# Patient Record
Sex: FEMALE | Race: WHITE | Employment: FULL TIME | URBAN - METROPOLITAN AREA
[De-identification: names, ages, dates, MRNs, and addresses within clinical notes are randomized per-mention and may not be internally consistent; named-entity substitution may affect disease eponyms.]

---

## 2021-02-22 LAB
ALBUMIN SERPL-MCNC: NORMAL G/DL
ALP BLD-CCNC: NORMAL U/L
ALT SERPL-CCNC: NORMAL U/L
ANION GAP SERPL CALCULATED.3IONS-SCNC: NORMAL MMOL/L
AST SERPL-CCNC: NORMAL U/L
BILIRUB SERPL-MCNC: NORMAL MG/DL
BUN BLDV-MCNC: NORMAL MG/DL
CALCIUM SERPL-MCNC: NORMAL MG/DL
CHLORIDE BLD-SCNC: NORMAL MMOL/L
CHOLESTEROL, TOTAL: NORMAL
CHOLESTEROL/HDL RATIO: NORMAL
CO2: NORMAL
CREAT SERPL-MCNC: NORMAL MG/DL
GFR CALCULATED: NORMAL
GLUCOSE BLD-MCNC: NORMAL MG/DL
HDLC SERPL-MCNC: NORMAL MG/DL
LDL CHOLESTEROL CALCULATED: NORMAL
NONHDLC SERPL-MCNC: NORMAL MG/DL
POTASSIUM SERPL-SCNC: NORMAL MMOL/L
SODIUM BLD-SCNC: NORMAL MMOL/L
TOTAL PROTEIN: NORMAL
TRIGL SERPL-MCNC: NORMAL MG/DL
VLDLC SERPL CALC-MCNC: NORMAL MG/DL

## 2021-04-28 VITALS
OXYGEN SATURATION: 98 % | HEART RATE: 98 BPM | BODY MASS INDEX: 28.12 KG/M2 | DIASTOLIC BLOOD PRESSURE: 74 MMHG | HEIGHT: 66 IN | SYSTOLIC BLOOD PRESSURE: 130 MMHG | RESPIRATION RATE: 14 BRPM | TEMPERATURE: 98 F | WEIGHT: 175 LBS

## 2021-04-28 RX ORDER — ACETAMINOPHEN 160 MG
TABLET,DISINTEGRATING ORAL DAILY
COMMUNITY
End: 2022-08-02

## 2021-04-28 RX ORDER — SIMVASTATIN 40 MG
40 TABLET ORAL DAILY
COMMUNITY
End: 2021-09-02

## 2021-05-26 LAB
ALBUMIN SERPL-MCNC: 4 G/DL (ref 3.5–5.2)
ALP BLD-CCNC: 66 U/L (ref 35–104)
ALT SERPL-CCNC: 27 U/L (ref 0–32)
AST SERPL-CCNC: 27 U/L (ref 0–31)
BILIRUB SERPL-MCNC: 0.3 MG/DL (ref 0–1.2)
BILIRUBIN DIRECT: <0.2 MG/DL (ref 0–0.3)
BILIRUBIN, INDIRECT: NORMAL MG/DL (ref 0–1)
CHOLESTEROL, FASTING: 260 MG/DL (ref 0–199)
HDLC SERPL-MCNC: 50 MG/DL
LDL CHOLESTEROL CALCULATED: 178 MG/DL (ref 0–99)
TOTAL PROTEIN: 7.3 G/DL (ref 6.4–8.3)
TRIGLYCERIDE, FASTING: 162 MG/DL (ref 0–149)
VLDLC SERPL CALC-MCNC: 32 MG/DL

## 2021-06-02 ENCOUNTER — OFFICE VISIT (OUTPATIENT)
Dept: PRIMARY CARE CLINIC | Age: 46
End: 2021-06-02
Payer: COMMERCIAL

## 2021-06-02 VITALS
HEART RATE: 95 BPM | WEIGHT: 175.9 LBS | TEMPERATURE: 97.8 F | HEIGHT: 66 IN | OXYGEN SATURATION: 97 % | DIASTOLIC BLOOD PRESSURE: 72 MMHG | BODY MASS INDEX: 28.27 KG/M2 | SYSTOLIC BLOOD PRESSURE: 128 MMHG

## 2021-06-02 DIAGNOSIS — L30.9 ECZEMA OF LOWER LEG: ICD-10-CM

## 2021-06-02 DIAGNOSIS — E78.2 MIXED HYPERLIPIDEMIA: Primary | ICD-10-CM

## 2021-06-02 PROCEDURE — 99213 OFFICE O/P EST LOW 20 MIN: CPT | Performed by: INTERNAL MEDICINE

## 2021-06-02 RX ORDER — LEVONORGESTREL AND ETHINYL ESTRADIOL 6-5-10
KIT ORAL
COMMUNITY
Start: 2021-04-29 | End: 2021-12-15

## 2021-06-02 RX ORDER — TRIAMCINOLONE ACETONIDE 0.25 MG/G
OINTMENT TOPICAL
Qty: 1 TUBE | Refills: 1 | Status: SHIPPED | OUTPATIENT
Start: 2021-06-02 | End: 2021-06-09

## 2021-06-02 RX ORDER — ATORVASTATIN CALCIUM 10 MG/1
TABLET, FILM COATED ORAL
COMMUNITY
Start: 2021-03-02 | End: 2021-06-02 | Stop reason: SDUPTHER

## 2021-06-02 RX ORDER — ATORVASTATIN CALCIUM 10 MG/1
10 TABLET, FILM COATED ORAL DAILY
Qty: 90 TABLET | Refills: 0 | Status: SHIPPED
Start: 2021-06-02 | End: 2021-09-02 | Stop reason: SDUPTHER

## 2021-06-02 SDOH — ECONOMIC STABILITY: FOOD INSECURITY: WITHIN THE PAST 12 MONTHS, THE FOOD YOU BOUGHT JUST DIDN'T LAST AND YOU DIDN'T HAVE MONEY TO GET MORE.: NEVER TRUE

## 2021-06-02 SDOH — ECONOMIC STABILITY: FOOD INSECURITY: WITHIN THE PAST 12 MONTHS, YOU WORRIED THAT YOUR FOOD WOULD RUN OUT BEFORE YOU GOT MONEY TO BUY MORE.: NEVER TRUE

## 2021-06-02 ASSESSMENT — SOCIAL DETERMINANTS OF HEALTH (SDOH): HOW HARD IS IT FOR YOU TO PAY FOR THE VERY BASICS LIKE FOOD, HOUSING, MEDICAL CARE, AND HEATING?: NOT HARD AT ALL

## 2021-06-02 ASSESSMENT — PATIENT HEALTH QUESTIONNAIRE - PHQ9
SUM OF ALL RESPONSES TO PHQ QUESTIONS 1-9: 0
2. FEELING DOWN, DEPRESSED OR HOPELESS: 0
1. LITTLE INTEREST OR PLEASURE IN DOING THINGS: 0
SUM OF ALL RESPONSES TO PHQ QUESTIONS 1-9: 0
SUM OF ALL RESPONSES TO PHQ QUESTIONS 1-9: 0
SUM OF ALL RESPONSES TO PHQ9 QUESTIONS 1 & 2: 0

## 2021-06-02 NOTE — PROGRESS NOTES
PERRLA, EOMI, clear conjunctiva, TMs clear, nose-clear, throat - no erythema  Neck:  Supple, no goiter, no carotid bruits, no LAD  Lungs:  CTA   Heart:  RRR, no murmurs, gallops or rubs  Abdomen:  Soft/nt/nd, + bowel sounds  Extremities:  No clubbing, cyanosis or edema  Skin: unremarkable except for 2 small patches of eczematous dermatitis that are dry mildly dark in color with rough skin on bilateral ankles    Cholesterol   Date Value Ref Range Status   02/10/2014 247 (H) 0 - 199 mg/dL Final     Triglycerides   Date Value Ref Range Status   02/10/2014 98 0 - 149 mg/dL Final     HDL   Date Value Ref Range Status   05/26/2021 50 >40 mg/dL Final     LDL Calculated   Date Value Ref Range Status   05/26/2021 178 (H) 0 - 99 mg/dL Final     VLDL Cholesterol Calculated   Date Value Ref Range Status   05/26/2021 32 mg/dL Final     Sodium   Date Value Ref Range Status   02/10/2014 139 132 - 146 mmol/L Final     Potassium   Date Value Ref Range Status   02/10/2014 4.3 3.5 - 5.0 mmol/L Final     Chloride   Date Value Ref Range Status   02/10/2014 106 98 - 107 mmol/L Final     CO2   Date Value Ref Range Status   02/10/2014 25 22 - 29 mmol/L Final     BUN   Date Value Ref Range Status   02/10/2014 10 6 - 20 mg/dL Final     CREATININE   Date Value Ref Range Status   02/10/2014 1.0 0.5 - 1.0 mg/dL Final     Glucose   Date Value Ref Range Status   02/10/2014 86 74 - 109 mg/dL Final     Calcium   Date Value Ref Range Status   02/10/2014 8.8 8.6 - 10.2 mg/dL Final     Total Protein   Date Value Ref Range Status   05/26/2021 7.3 6.4 - 8.3 g/dL Final     Albumin   Date Value Ref Range Status   05/26/2021 4.0 3.5 - 5.2 g/dL Final     Total Bilirubin   Date Value Ref Range Status   05/26/2021 0.3 0.0 - 1.2 mg/dL Final     Alkaline Phosphatase   Date Value Ref Range Status   05/26/2021 66 35 - 104 U/L Final     AST   Date Value Ref Range Status   05/26/2021 27 0 - 31 U/L Final     ALT   Date Value Ref Range Status   05/26/2021 27 0 - 32 U/L Final     Gfr Calculated   Date Value Ref Range Status   02/10/2014 >60 >=60 ml/mn/1.73 Final     Comment:     Chronic Kidney Disease- less than 60 ml/min/1.73 sq.m. Kidney Failure- less than 15 ml/min/1.73 sq.m. No results found. Assessment/Plan:      Outpatient Encounter Medications as of 6/2/2021   Medication Sig Dispense Refill    LEVONEST 50-30/75-40/ 125-30 MCG TABS take 1 tablet by mouth once daily      atorvastatin (LIPITOR) 10 MG tablet Take 1 tablet by mouth daily 90 tablet 0    triamcinolone (KENALOG) 0.025 % ointment Apply topically 2 times daily. 1 Tube 1    CALCIUM PO Take by mouth      Multiple Vitamin (MULTIVITAMIN ADULT PO) Take by mouth      Probiotic Product (PROBIOTIC PO) Take by mouth      Ascorbic Acid (VITAMIN C PO) Take by mouth      Cholecalciferol (VITAMIN D3) 50 MCG (2000 UT) CAPS Take by mouth daily      [DISCONTINUED] atorvastatin (LIPITOR) 10 MG tablet take 1 tablet by mouth once daily      simvastatin (ZOCOR) 40 MG tablet Take 40 mg by mouth daily (Patient not taking: Reported on 6/2/2021)       No facility-administered encounter medications on file as of 6/2/2021. Evelyne Green was seen today for hypertension. Diagnoses and all orders for this visit:    Mixed hyperlipidemia  -     Lipid Panel; Future  -     Comprehensive Metabolic Panel; Future    Eczema of lower leg  -     triamcinolone (KENALOG) 0.025 % ointment; Apply topically 2 times daily. Other orders  -     atorvastatin (LIPITOR) 10 MG tablet; Take 1 tablet by mouth daily         There are no Patient Instructions on file for this visit. On this date 6/2/2021 I have spent 20 minutes reviewing previous notes, test results and face to face with the patient discussing the diagnosis and importance of compliance with the treatment plan as well as documenting on the day of the visit.       Justin Mendez MD   6/2/21

## 2021-09-02 ENCOUNTER — OFFICE VISIT (OUTPATIENT)
Dept: PRIMARY CARE CLINIC | Age: 46
End: 2021-09-02
Payer: COMMERCIAL

## 2021-09-02 VITALS
SYSTOLIC BLOOD PRESSURE: 146 MMHG | BODY MASS INDEX: 28.69 KG/M2 | DIASTOLIC BLOOD PRESSURE: 80 MMHG | HEART RATE: 81 BPM | HEIGHT: 66 IN | TEMPERATURE: 97.3 F | OXYGEN SATURATION: 98 % | WEIGHT: 178.5 LBS

## 2021-09-02 DIAGNOSIS — R92.8 ABNORMAL MAMMOGRAM: ICD-10-CM

## 2021-09-02 DIAGNOSIS — E78.2 MIXED HYPERLIPIDEMIA: Primary | ICD-10-CM

## 2021-09-02 DIAGNOSIS — I10 ESSENTIAL HYPERTENSION: ICD-10-CM

## 2021-09-02 PROCEDURE — 99213 OFFICE O/P EST LOW 20 MIN: CPT | Performed by: INTERNAL MEDICINE

## 2021-09-02 RX ORDER — ATORVASTATIN CALCIUM 20 MG/1
20 TABLET, FILM COATED ORAL DAILY
Qty: 90 TABLET | Refills: 0 | Status: SHIPPED
Start: 2021-09-02 | End: 2021-12-02 | Stop reason: SDUPTHER

## 2021-09-02 NOTE — PROGRESS NOTES
Chief Complaint   Patient presents with    Hyperlipidemia     f/u visit with labs. had mammo done and is haveing US of left breast next. Dr. Jasvir Munoz did the referral for the mammo     Other     denies symptoms of covid and had vaccine for covid in May       HPI:  Patient is here for follow-up of hyperlipidemia. Blood work was discussed with patient unfortunately LDL still elevated at 161 patient has been compliant with medications but she has been eating a lot of gifford. She was instructed about a low-fat diet. Her blood pressure today is noted to be elevated but she has been under a lot of stress at work and she had a lot of salt with supper last night so we will watch that next visit. She denies any current complaints of any shortness of breath chest pains abdominal pains nausea vomiting or diarrhea  Mammogram results were discussed with patient she needs an additional ultrasound on the left breast    Past Medical History, Surgical History, and Family History has been reviewed and updated.     Review of Systems:  Constitutional:  No fever, no fatigue, no chills, no headaches, no weight change  Dermatology:  No rash, no mole, no dry or sensitive skin  ENT:  No cough, no sore throat, no sinus pain, no runny nose, no ear pain  Cardiology:  No chest pain, no palpitations, no leg edema, no shortness of breath, no PND  Gastroenterology:  No dysphagia, no abdominal pain, no nausea, no vomiting, no constipation, no diarrhea, no heartburn  Musculoskeletal:  No joint pain, no leg cramps, no back pain, no muscle aches  Respiratory:  No shortness of breath, no orthopnea, no wheezing, no RASMUSSEN, no hemoptysis  Urology:  No blood in the urine, no urinary frequency, no urinary incontinence, no urinary urgency, no nocturia, no dysuria    Vitals:    09/02/21 1343 09/02/21 1351   BP: (!) 160/86 (!) 146/80   Site: Right Upper Arm    Position: Sitting    Cuff Size: Large Adult    Pulse: 81    Temp: 97.3 °F (36.3 °C)    SpO2: 98% Weight: 178 lb 8 oz (81 kg)    Height: 5' 6\" (1.676 m)        General:  Patient alert and oriented x 3, NAD, pleasant  HEENT:  Atraumatic, normocephalic, PERRLA, EOMI, clear conjunctiva, TMs clear, nose-clear, throat - no erythema  Neck:  Supple, no goiter, no carotid bruits, no LAD  Lungs:  CTA   Heart:  RRR, no murmurs, gallops or rubs  Abdomen:  Soft/nt/nd, + bowel sounds  Extremities:  No clubbing, cyanosis or edema  Skin: unremarkable    Cholesterol, Total   Date Value Ref Range Status   08/27/2021 274 (H) 0 - 199 mg/dL Final     Triglycerides   Date Value Ref Range Status   08/27/2021 358 (H) 0 - 149 mg/dL Final     HDL   Date Value Ref Range Status   08/27/2021 41 >40 mg/dL Final     LDL Calculated   Date Value Ref Range Status   08/27/2021 161 (H) 0 - 99 mg/dL Final     VLDL Cholesterol Calculated   Date Value Ref Range Status   08/27/2021 72 mg/dL Final     Sodium   Date Value Ref Range Status   08/27/2021 140 132 - 146 mmol/L Final     Potassium   Date Value Ref Range Status   08/27/2021 4.4 3.5 - 5.0 mmol/L Final     Chloride   Date Value Ref Range Status   08/27/2021 102 98 - 107 mmol/L Final     CO2   Date Value Ref Range Status   08/27/2021 24 22 - 29 mmol/L Final     BUN   Date Value Ref Range Status   08/27/2021 17 6 - 20 mg/dL Final     CREATININE   Date Value Ref Range Status   08/27/2021 0.9 0.5 - 1.0 mg/dL Final     Glucose   Date Value Ref Range Status   08/27/2021 71 (L) 74 - 99 mg/dL Final     Calcium   Date Value Ref Range Status   08/27/2021 9.3 8.6 - 10.2 mg/dL Final     Total Protein   Date Value Ref Range Status   08/27/2021 7.0 6.4 - 8.3 g/dL Final     Albumin   Date Value Ref Range Status   08/27/2021 4.2 3.5 - 5.2 g/dL Final     Total Bilirubin   Date Value Ref Range Status   08/27/2021 <0.2 0.0 - 1.2 mg/dL Final     Alkaline Phosphatase   Date Value Ref Range Status   08/27/2021 69 35 - 104 U/L Final     AST   Date Value Ref Range Status   08/27/2021 27 0 - 31 U/L Final     ALT Date Value Ref Range Status   08/27/2021 30 0 - 32 U/L Final     GFR Non-   Date Value Ref Range Status   08/27/2021 >60 >=60 mL/min/1.73 Final     Comment:     Chronic Kidney Disease: less than 60 ml/min/1.73 sq.m. Kidney Failure: less than 15 ml/min/1.73 sq.m. Results valid for patients 18 years and older. GFR    Date Value Ref Range Status   08/27/2021 >60  Final        No results found. Assessment/Plan:      Outpatient Encounter Medications as of 9/2/2021   Medication Sig Dispense Refill    atorvastatin (LIPITOR) 20 MG tablet Take 1 tablet by mouth daily 90 tablet 0    LEVONEST 50-30/75-40/ 125-30 MCG TABS take 1 tablet by mouth once daily      CALCIUM PO Take by mouth      Multiple Vitamin (MULTIVITAMIN ADULT PO) Take by mouth      Probiotic Product (PROBIOTIC PO) Take by mouth      Ascorbic Acid (VITAMIN C PO) Take by mouth      Cholecalciferol (VITAMIN D3) 50 MCG (2000 UT) CAPS Take by mouth daily      [DISCONTINUED] atorvastatin (LIPITOR) 10 MG tablet Take 1 tablet by mouth daily 90 tablet 0    [DISCONTINUED] simvastatin (ZOCOR) 40 MG tablet Take 40 mg by mouth daily (Patient not taking: Reported on 6/2/2021)       No facility-administered encounter medications on file as of 9/2/2021. Manoj Ayala was seen today for hyperlipidemia and other. Diagnoses and all orders for this visit:    Mixed hyperlipidemia  -     atorvastatin (LIPITOR) 20 MG tablet; Take 1 tablet by mouth daily  -     Lipid Panel; Future  -     Hepatic Function Panel; Future    Essential hypertension    Abnormal mammogram         Patient Instructions     Patient Education        Learning About Low-Fat Eating  What is low-fat eating? Most food has some fat in it. Your body needs some fat to be healthy. But some kinds of fats are healthier than others. In a low-fat eating plan, you try to choose healthier fats and eat fewer unhealthy fats.  Healthy fats include olive and canola oil. Try to avoid eating too much saturated fat, such as in cheese and meats. You do not need to cut all fat from your diet. But you can make healthier choices about the types and amount of fat you eat. Even though it is a good idea to choose healthier fats, it is still important to be careful of how much fat you eat, because all fats are high in calories. What are the different types of fats? Unhealthy fat  · Saturated fat. Saturated fats are mostly in animal foods, such as meat and dairy foods. Tropical oils, such as coconut oil, palm oil, and cocoa butter, are also saturated fats. Healthy fats  · Monounsaturated fat. Monounsaturated fats are liquid at room temperature but get solid when refrigerated. Eating foods that are high in this fat may help lower your \"bad\" (LDL) cholesterol, keep your \"good\" (HDL) cholesterol level up, and lower your chances of getting coronary artery disease. This fat is found in canola oil, olive oil, peanut oil, olives, avocados, nuts, and nut butters. · Polyunsaturated fat. Polyunsaturated fats are liquid at room temperature. They are in safflower, sunflower, and corn oils. They are also the main fat in seafood. Omega-3 fatty acids are types of polyunsaturated fat. Eating fish may lower your chances of getting coronary artery disease. Fatty fish such as salmon and mackerel contain these healthy fatty acids. So do ground flaxseeds and flaxseed oil, soybeans, walnuts, and seeds. Why cut down on unhealthy fats? Eating foods that contain saturated fats can raise the LDL (\"bad\") cholesterol in your blood. Having a high level of LDL cholesterol increases your chance of hardening of the arteries (atherosclerosis), which can lead to heart disease, heart attack, and stroke. In general:  · No more than 10% of your daily calories should come from saturated fat. This is about 20 grams in a 2,000-calorie diet.   · No more than 10% of your daily calories should come from polyunsaturated fat. This is about 20 grams in a 2,000-calorie diet. · Monounsaturated fats can be up to 15% of your daily calories. This is about 25 to 30 grams in a 2,000-calorie diet. If you're not sure how much fat you should be eating or how many calories you need each day to stay at a healthy weight, talk to a registered dietitian. A dietitian can help you create a plan that's right for you. What can you do to cut down on fat? Foods like cheese, butter, sausage, and desserts can have a lot of unhealthy fats. Try these tips for healthier meals at home and when you eat out. At home  · Fill up on fruits, vegetables, and whole grains. · Think of meat as a side dish instead of as the main part of your meal.  · When you do eat meat, make it extra-lean ground beef (97% lean), ground turkey breast (without skin added), meats with fat trimmed off before cooking, or skinless chicken. · Try main dishes that use whole wheat pasta, brown rice, dried beans, or vegetables. · Use cooking methods that use little or no fat, such as broiling, steaming, or grilling. Use cooking spray instead of oil. If you use oil, use a monounsaturated oil, such as canola or olive oil. · Read food labels on canned, bottled, or packaged foods. Choose those with little saturated fat. When eating out at a restaurant  · Order foods that are broiled or poached instead of fried or breaded. · Cut back on the amount of butter or margarine that you use on bread. Use small amounts of olive oil instead. · Order sauces, gravies, and salad dressings on the side, and use only a little. · When you order pasta, choose tomato sauce instead of cream sauce. · Ask for salsa with your baked potato instead of sour cream, butter, cheese, or gifford. Where can you learn more? Go to https://jaime.[employer]. org and sign in to your [employer] account.  Enter C451 in the [employer] box to learn more about \"Learning About Low-Fat Eating. \"     If you do not have an account, please click on the \"Sign Up Now\" link. Current as of: December 17, 2020               Content Version: 12.9  © 2006-2021 Healthwise, Incorporated. Care instructions adapted under license by [facility] ([facility]). If you have questions about a medical condition or this instruction, always ask your healthcare professional. [place] [address] any warranty or liability for your use of this information. On this date 9/2/2021 I have spent 25 minutes reviewing previous notes, test results and face to face with the patient discussing the diagnosis and importance of compliance with the treatment plan as well as documenting on the day of the visit.       Elroy Ferrari MD   9/2/21

## 2021-09-02 NOTE — PATIENT INSTRUCTIONS
Patient Education        Learning About Low-Fat Eating  What is low-fat eating? Most food has some fat in it. Your body needs some fat to be healthy. But some kinds of fats are healthier than others. In a low-fat eating plan, you try to choose healthier fats and eat fewer unhealthy fats. Healthy fats include olive and canola oil. Try to avoid eating too much saturated fat, such as in cheese and meats. You do not need to cut all fat from your diet. But you can make healthier choices about the types and amount of fat you eat. Even though it is a good idea to choose healthier fats, it is still important to be careful of how much fat you eat, because all fats are high in calories. What are the different types of fats? Unhealthy fat  · Saturated fat. Saturated fats are mostly in animal foods, such as meat and dairy foods. Tropical oils, such as coconut oil, palm oil, and cocoa butter, are also saturated fats. Healthy fats  · Monounsaturated fat. Monounsaturated fats are liquid at room temperature but get solid when refrigerated. Eating foods that are high in this fat may help lower your \"bad\" (LDL) cholesterol, keep your \"good\" (HDL) cholesterol level up, and lower your chances of getting coronary artery disease. This fat is found in canola oil, olive oil, peanut oil, olives, avocados, nuts, and nut butters. · Polyunsaturated fat. Polyunsaturated fats are liquid at room temperature. They are in safflower, sunflower, and corn oils. They are also the main fat in seafood. Omega-3 fatty acids are types of polyunsaturated fat. Eating fish may lower your chances of getting coronary artery disease. Fatty fish such as salmon and mackerel contain these healthy fatty acids. So do ground flaxseeds and flaxseed oil, soybeans, walnuts, and seeds. Why cut down on unhealthy fats? Eating foods that contain saturated fats can raise the LDL (\"bad\") cholesterol in your blood.  Having a high level of LDL cholesterol increases your chance of hardening of the arteries (atherosclerosis), which can lead to heart disease, heart attack, and stroke. In general:  · No more than 10% of your daily calories should come from saturated fat. This is about 20 grams in a 2,000-calorie diet. · No more than 10% of your daily calories should come from polyunsaturated fat. This is about 20 grams in a 2,000-calorie diet. · Monounsaturated fats can be up to 15% of your daily calories. This is about 25 to 30 grams in a 2,000-calorie diet. If you're not sure how much fat you should be eating or how many calories you need each day to stay at a healthy weight, talk to a registered dietitian. A dietitian can help you create a plan that's right for you. What can you do to cut down on fat? Foods like cheese, butter, sausage, and desserts can have a lot of unhealthy fats. Try these tips for healthier meals at home and when you eat out. At home  · Fill up on fruits, vegetables, and whole grains. · Think of meat as a side dish instead of as the main part of your meal.  · When you do eat meat, make it extra-lean ground beef (97% lean), ground turkey breast (without skin added), meats with fat trimmed off before cooking, or skinless chicken. · Try main dishes that use whole wheat pasta, brown rice, dried beans, or vegetables. · Use cooking methods that use little or no fat, such as broiling, steaming, or grilling. Use cooking spray instead of oil. If you use oil, use a monounsaturated oil, such as canola or olive oil. · Read food labels on canned, bottled, or packaged foods. Choose those with little saturated fat. When eating out at a restaurant  · Order foods that are broiled or poached instead of fried or breaded. · Cut back on the amount of butter or margarine that you use on bread. Use small amounts of olive oil instead. · Order sauces, gravies, and salad dressings on the side, and use only a little.   · When you order pasta, choose tomato sauce instead of cream sauce. · Ask for salsa with your baked potato instead of sour cream, butter, cheese, or gifford. Where can you learn more? Go to https://GENIACpeVarentec.[employer]. org and sign in to your [employer] account. Enter D590 in the [facility] box to learn more about \"Learning About Low-Fat Eating. \"     If you do not have an account, please click on the \"Sign Up Now\" link. Current as of: December 17, 2020               Content Version: 12.9  © 4496-8712 Healthwise, Incorporated. Care instructions adapted under license by [facility] ([facility]). If you have questions about a medical condition or this instruction, always ask your healthcare professional. [address] any warranty or liability for your use of this information.

## 2021-11-22 DIAGNOSIS — E78.2 MIXED HYPERLIPIDEMIA: ICD-10-CM

## 2021-11-23 LAB
ALBUMIN SERPL-MCNC: 4.4 G/DL (ref 3.5–5.2)
ALP BLD-CCNC: 96 U/L (ref 35–104)
ALT SERPL-CCNC: 43 U/L (ref 0–32)
AST SERPL-CCNC: 29 U/L (ref 0–31)
BILIRUB SERPL-MCNC: 0.5 MG/DL (ref 0–1.2)
BILIRUBIN DIRECT: <0.2 MG/DL (ref 0–0.3)
BILIRUBIN, INDIRECT: ABNORMAL MG/DL (ref 0–1)
CHOLESTEROL, TOTAL: 278 MG/DL (ref 0–199)
HDLC SERPL-MCNC: 50 MG/DL
LDL CHOLESTEROL CALCULATED: 182 MG/DL (ref 0–99)
TOTAL PROTEIN: 7 G/DL (ref 6.4–8.3)
TRIGL SERPL-MCNC: 229 MG/DL (ref 0–149)
VLDLC SERPL CALC-MCNC: 46 MG/DL

## 2021-12-02 ENCOUNTER — OFFICE VISIT (OUTPATIENT)
Dept: PRIMARY CARE CLINIC | Age: 46
End: 2021-12-02
Payer: COMMERCIAL

## 2021-12-02 VITALS
HEART RATE: 77 BPM | OXYGEN SATURATION: 98 % | TEMPERATURE: 96.9 F | BODY MASS INDEX: 28.42 KG/M2 | HEIGHT: 66 IN | DIASTOLIC BLOOD PRESSURE: 94 MMHG | SYSTOLIC BLOOD PRESSURE: 160 MMHG | WEIGHT: 176.8 LBS

## 2021-12-02 DIAGNOSIS — E78.2 MIXED HYPERLIPIDEMIA: ICD-10-CM

## 2021-12-02 DIAGNOSIS — R92.8 ABNORMAL MAMMOGRAM: ICD-10-CM

## 2021-12-02 DIAGNOSIS — I10 ESSENTIAL HYPERTENSION: Primary | ICD-10-CM

## 2021-12-02 DIAGNOSIS — R79.89 ELEVATED LFTS: ICD-10-CM

## 2021-12-02 PROCEDURE — 99213 OFFICE O/P EST LOW 20 MIN: CPT | Performed by: INTERNAL MEDICINE

## 2021-12-02 RX ORDER — LOSARTAN POTASSIUM 50 MG/1
50 TABLET ORAL DAILY
Qty: 30 TABLET | Refills: 2 | Status: SHIPPED
Start: 2021-12-02 | End: 2022-03-15 | Stop reason: SDUPTHER

## 2021-12-02 RX ORDER — ATORVASTATIN CALCIUM 20 MG/1
20 TABLET, FILM COATED ORAL DAILY
Qty: 90 TABLET | Refills: 0 | Status: SHIPPED
Start: 2021-12-02 | End: 2022-04-18 | Stop reason: SDUPTHER

## 2021-12-02 NOTE — PROGRESS NOTES
Chief Complaint   Patient presents with    Follow-up     with labs.  Other     denies symptoms for covid.  had covid vaccines and getting booster tomorrow. declines flu vaccine today       HPI:  Patient is here for follow-up of hyperlipidemia. Patient is doing well she claims that she is compliant with medications although her LDL is more elevated than last time reaching 182. She has been taking the Lipitor with a probiotic and she was advised against that. She has not also been following a low-cholesterol diet and she was counseled. Blood work was discussed with patient. Ultrasound of the breast which was a 6-month follow-up showed benign changes with a septated cyst and annual screening was recommended. Blood pressure today was noted to be elevated and on repeat measurement it was 160/94. We will start the patient on losartan 50 mg daily and she was counseled about a low-salt low caffeine diet. She has a mole on the abdominal wall which appears benign  Past Medical History, Surgical History, and Family History has been reviewed and updated.     Review of Systems:  Constitutional:  No fever, no fatigue, no chills, no headaches, no weight change  Dermatology:  No rash, no mole, no dry or sensitive skin  ENT:  No cough, no sore throat, no sinus pain, no runny nose, no ear pain  Cardiology:  No chest pain, no palpitations, no leg edema, no shortness of breath, no PND  Gastroenterology:  No dysphagia, no abdominal pain, no nausea, no vomiting, no constipation, no diarrhea, no heartburn  Musculoskeletal:  No joint pain, no leg cramps, no back pain, no muscle aches  Respiratory:  No shortness of breath, no orthopnea, no wheezing, no RASMUSSEN, no hemoptysis  Urology:  No blood in the urine, no urinary frequency, no urinary incontinence, no urinary urgency, no nocturia, no dysuria    Vitals:    12/02/21 1350 12/02/21 1357 12/02/21 1424   BP: (!) 154/80 (!) 146/78 (!) 160/94   Site: Right Upper Arm     Position: Sitting     Cuff Size: Large Adult     Pulse: 77     Temp: 96.9 °F (36.1 °C)     SpO2: 98%     Weight: 176 lb 12.8 oz (80.2 kg)     Height: 5' 6\" (1.676 m)         General:  Patient alert and oriented x 3, NAD, pleasant  HEENT:  Atraumatic, normocephalic, PERRLA, EOMI, clear conjunctiva, TMs clear, nose-clear, throat - no erythema  Neck:  Supple, no goiter, no carotid bruits, no LAD  Lungs:  CTA   Heart:  RRR, no murmurs, gallops or rubs  Abdomen:  Soft/nt/nd, + bowel sounds  Extremities:  No clubbing, cyanosis or edema  Skin: unremarkable    Cholesterol, Total   Date Value Ref Range Status   11/22/2021 278 (H) 0 - 199 mg/dL Final     Triglycerides   Date Value Ref Range Status   11/22/2021 229 (H) 0 - 149 mg/dL Final     HDL   Date Value Ref Range Status   11/22/2021 50 >40 mg/dL Final     LDL Calculated   Date Value Ref Range Status   11/22/2021 182 (H) 0 - 99 mg/dL Final     VLDL Cholesterol Calculated   Date Value Ref Range Status   11/22/2021 46 mg/dL Final     Sodium   Date Value Ref Range Status   08/27/2021 140 132 - 146 mmol/L Final     Potassium   Date Value Ref Range Status   08/27/2021 4.4 3.5 - 5.0 mmol/L Final     Chloride   Date Value Ref Range Status   08/27/2021 102 98 - 107 mmol/L Final     CO2   Date Value Ref Range Status   08/27/2021 24 22 - 29 mmol/L Final     BUN   Date Value Ref Range Status   08/27/2021 17 6 - 20 mg/dL Final     CREATININE   Date Value Ref Range Status   08/27/2021 0.9 0.5 - 1.0 mg/dL Final     Glucose   Date Value Ref Range Status   08/27/2021 71 (L) 74 - 99 mg/dL Final     Calcium   Date Value Ref Range Status   08/27/2021 9.3 8.6 - 10.2 mg/dL Final     Total Protein   Date Value Ref Range Status   11/22/2021 7.0 6.4 - 8.3 g/dL Final     Albumin   Date Value Ref Range Status   11/22/2021 4.4 3.5 - 5.2 g/dL Final     Total Bilirubin   Date Value Ref Range Status   11/22/2021 0.5 0.0 - 1.2 mg/dL Final     Alkaline Phosphatase   Date Value Ref Range Status   11/22/2021 96 35 - 104 U/L Final     AST   Date Value Ref Range Status   11/22/2021 29 0 - 31 U/L Final     ALT   Date Value Ref Range Status   11/22/2021 43 (H) 0 - 32 U/L Final     GFR Non-   Date Value Ref Range Status   08/27/2021 >60 >=60 mL/min/1.73 Final     Comment:     Chronic Kidney Disease: less than 60 ml/min/1.73 sq.m. Kidney Failure: less than 15 ml/min/1.73 sq.m. Results valid for patients 18 years and older. GFR    Date Value Ref Range Status   08/27/2021 >60  Final        No results found. Assessment/Plan:      Outpatient Encounter Medications as of 12/2/2021   Medication Sig Dispense Refill    losartan (COZAAR) 50 MG tablet Take 1 tablet by mouth daily 30 tablet 2    atorvastatin (LIPITOR) 20 MG tablet Take 1 tablet by mouth daily 90 tablet 0    LEVONEST 50-30/75-40/ 125-30 MCG TABS take 1 tablet by mouth once daily      CALCIUM PO Take by mouth      Multiple Vitamin (MULTIVITAMIN ADULT PO) Take by mouth      Probiotic Product (PROBIOTIC PO) Take by mouth      Ascorbic Acid (VITAMIN C PO) Take by mouth      Cholecalciferol (VITAMIN D3) 50 MCG (2000 UT) CAPS Take by mouth daily      [DISCONTINUED] atorvastatin (LIPITOR) 20 MG tablet Take 1 tablet by mouth daily 90 tablet 0     No facility-administered encounter medications on file as of 12/2/2021. Robles Prajapati was seen today for follow-up and other. Diagnoses and all orders for this visit:    Essential hypertension  -     losartan (COZAAR) 50 MG tablet; Take 1 tablet by mouth daily    Mixed hyperlipidemia  -     atorvastatin (LIPITOR) 20 MG tablet; Take 1 tablet by mouth daily    Abnormal mammogram    Elevated LFTs       Start losartan 50 mg daily  Low caffeine low salt diet  Start taking Lipitor 20 mg nightly by itself  RTC in 2 weeks for follow-up on blood pressure  There are no Patient Instructions on file for this visit.      On this date 12/2/2021 I have spent 25 minutes reviewing previous notes, test results and face to face with the patient discussing the diagnosis and importance of compliance with the treatment plan as well as documenting on the day of the visit.       Karen Toro MD   12/2/21

## 2021-12-15 ENCOUNTER — OFFICE VISIT (OUTPATIENT)
Dept: PRIMARY CARE CLINIC | Age: 46
End: 2021-12-15
Payer: COMMERCIAL

## 2021-12-15 VITALS
BODY MASS INDEX: 28.33 KG/M2 | HEIGHT: 66 IN | TEMPERATURE: 97.3 F | HEART RATE: 90 BPM | DIASTOLIC BLOOD PRESSURE: 78 MMHG | SYSTOLIC BLOOD PRESSURE: 130 MMHG | WEIGHT: 176.3 LBS | OXYGEN SATURATION: 99 %

## 2021-12-15 DIAGNOSIS — I10 ESSENTIAL HYPERTENSION: ICD-10-CM

## 2021-12-15 DIAGNOSIS — E78.2 MIXED HYPERLIPIDEMIA: Primary | ICD-10-CM

## 2021-12-15 DIAGNOSIS — R79.89 ELEVATED LFTS: ICD-10-CM

## 2021-12-15 PROCEDURE — 99213 OFFICE O/P EST LOW 20 MIN: CPT | Performed by: INTERNAL MEDICINE

## 2021-12-15 NOTE — PROGRESS NOTES
Chief Complaint   Patient presents with    Hypertension     Pt here for BP check       HPI:  Patient is here for follow-up of hypertension and hyperlipidemia. She is doing well without any new complaints. She has been tolerating the medication well without any side effects. Her blood pressure today is a lot better control. She denies any headaches or blurred vision. She has been compliant with medications. She has stopped her birth control pills. Past Medical History, Surgical History, and Family History has been reviewed and updated.     Review of Systems:  Constitutional:  No fever, no fatigue, no chills, no headaches, no weight change  Dermatology:  No rash, no mole, no dry or sensitive skin  ENT:  No cough, no sore throat, no sinus pain, no runny nose, no ear pain  Cardiology:  No chest pain, no palpitations, no leg edema, no shortness of breath, no PND  Gastroenterology:  No dysphagia, no abdominal pain, no nausea, no vomiting, no constipation, no diarrhea, no heartburn  Musculoskeletal:  No joint pain, no leg cramps, no back pain, no muscle aches  Respiratory:  No shortness of breath, no orthopnea, no wheezing, no RASMUSSEN, no hemoptysis  Urology:  No blood in the urine, no urinary frequency, no urinary incontinence, no urinary urgency, no nocturia, no dysuria    Vitals:    12/15/21 0949   BP: 130/78   Pulse: 90   Temp: 97.3 °F (36.3 °C)   SpO2: 99%   Weight: 176 lb 4.8 oz (80 kg)   Height: 5' 6\" (1.676 m)       General:  Patient alert and oriented x 3, NAD, pleasant  HEENT:  Atraumatic, normocephalic, PERRLA, EOMI, clear conjunctiva, TMs clear, nose-clear, throat - no erythema  Neck:  Supple, no goiter, no carotid bruits, no LAD  Lungs:  CTA   Heart:  RRR, no murmurs, gallops or rubs  Abdomen:  Soft/nt/nd, + bowel sounds  Extremities:  No clubbing, cyanosis or edema  Skin: unremarkable    Cholesterol, Total   Date Value Ref Range Status   11/22/2021 278 (H) 0 - 199 mg/dL Final     Triglycerides   Date Value Ref Range Status   11/22/2021 229 (H) 0 - 149 mg/dL Final     HDL   Date Value Ref Range Status   11/22/2021 50 >40 mg/dL Final     LDL Calculated   Date Value Ref Range Status   11/22/2021 182 (H) 0 - 99 mg/dL Final     VLDL Cholesterol Calculated   Date Value Ref Range Status   11/22/2021 46 mg/dL Final     Sodium   Date Value Ref Range Status   08/27/2021 140 132 - 146 mmol/L Final     Potassium   Date Value Ref Range Status   08/27/2021 4.4 3.5 - 5.0 mmol/L Final     Chloride   Date Value Ref Range Status   08/27/2021 102 98 - 107 mmol/L Final     CO2   Date Value Ref Range Status   08/27/2021 24 22 - 29 mmol/L Final     BUN   Date Value Ref Range Status   08/27/2021 17 6 - 20 mg/dL Final     CREATININE   Date Value Ref Range Status   08/27/2021 0.9 0.5 - 1.0 mg/dL Final     Glucose   Date Value Ref Range Status   08/27/2021 71 (L) 74 - 99 mg/dL Final     Calcium   Date Value Ref Range Status   08/27/2021 9.3 8.6 - 10.2 mg/dL Final     Total Protein   Date Value Ref Range Status   11/22/2021 7.0 6.4 - 8.3 g/dL Final     Albumin   Date Value Ref Range Status   11/22/2021 4.4 3.5 - 5.2 g/dL Final     Total Bilirubin   Date Value Ref Range Status   11/22/2021 0.5 0.0 - 1.2 mg/dL Final     Alkaline Phosphatase   Date Value Ref Range Status   11/22/2021 96 35 - 104 U/L Final     AST   Date Value Ref Range Status   11/22/2021 29 0 - 31 U/L Final     ALT   Date Value Ref Range Status   11/22/2021 43 (H) 0 - 32 U/L Final     GFR Non-   Date Value Ref Range Status   08/27/2021 >60 >=60 mL/min/1.73 Final     Comment:     Chronic Kidney Disease: less than 60 ml/min/1.73 sq.m. Kidney Failure: less than 15 ml/min/1.73 sq.m. Results valid for patients 18 years and older. GFR    Date Value Ref Range Status   08/27/2021 >60  Final        No results found.      Assessment/Plan:      Outpatient Encounter Medications as of 12/15/2021   Medication Sig Dispense Refill    losartan (COZAAR) 50 MG tablet Take 1 tablet by mouth daily 30 tablet 2    atorvastatin (LIPITOR) 20 MG tablet Take 1 tablet by mouth daily 90 tablet 0    CALCIUM PO Take by mouth      Multiple Vitamin (MULTIVITAMIN ADULT PO) Take by mouth      Probiotic Product (PROBIOTIC PO) Take by mouth      Ascorbic Acid (VITAMIN C PO) Take by mouth      Cholecalciferol (VITAMIN D3) 50 MCG (2000 UT) CAPS Take by mouth daily      [DISCONTINUED] LEVONEST 50-30/75-40/ 125-30 MCG TABS take 1 tablet by mouth once daily (Patient not taking: Reported on 12/15/2021)       No facility-administered encounter medications on file as of 12/15/2021. Edna Arnold was seen today for hypertension. Diagnoses and all orders for this visit:    Mixed hyperlipidemia  -     Lipid Panel; Future  -     Hepatic Function Panel; Future    Essential hypertension  -     Urinalysis; Future  -     Microalbumin / Creatinine Urine Ratio; Future    Elevated LFTs         There are no Patient Instructions on file for this visit. On this date 12/15/2021 I have spent a 20 minutes reviewing previous notes, test results and face to face with the patient discussing the diagnosis and importance of compliance with the treatment plan as well as documenting on the day of the visit.       Sheila Cullen MD   12/15/21

## 2022-03-11 DIAGNOSIS — I10 ESSENTIAL HYPERTENSION: ICD-10-CM

## 2022-03-11 DIAGNOSIS — E78.2 MIXED HYPERLIPIDEMIA: ICD-10-CM

## 2022-03-11 LAB
ALBUMIN SERPL-MCNC: 4.3 G/DL (ref 3.5–5.2)
ALP BLD-CCNC: 125 U/L (ref 35–104)
ALT SERPL-CCNC: 85 U/L (ref 0–32)
AST SERPL-CCNC: 36 U/L (ref 0–31)
BILIRUB SERPL-MCNC: 0.3 MG/DL (ref 0–1.2)
BILIRUBIN DIRECT: <0.2 MG/DL (ref 0–0.3)
BILIRUBIN URINE: NEGATIVE
BILIRUBIN, INDIRECT: ABNORMAL MG/DL (ref 0–1)
BLOOD, URINE: NEGATIVE
CHOLESTEROL, TOTAL: 278 MG/DL (ref 0–199)
CLARITY: CLEAR
COLOR: YELLOW
CREATININE URINE: 52 MG/DL (ref 29–226)
GLUCOSE URINE: NEGATIVE MG/DL
HDLC SERPL-MCNC: 44 MG/DL
KETONES, URINE: NEGATIVE MG/DL
LDL CHOLESTEROL CALCULATED: 194 MG/DL (ref 0–99)
LEUKOCYTE ESTERASE, URINE: NEGATIVE
MICROALBUMIN UR-MCNC: <12 MG/L
MICROALBUMIN/CREAT UR-RTO: ABNORMAL (ref 0–30)
NITRITE, URINE: NEGATIVE
PH UA: 6 (ref 5–9)
PROTEIN UA: NEGATIVE MG/DL
SPECIFIC GRAVITY UA: <=1.005 (ref 1–1.03)
TOTAL PROTEIN: 7.1 G/DL (ref 6.4–8.3)
TRIGL SERPL-MCNC: 202 MG/DL (ref 0–149)
UROBILINOGEN, URINE: 0.2 E.U./DL
VLDLC SERPL CALC-MCNC: 40 MG/DL

## 2022-03-15 ENCOUNTER — OFFICE VISIT (OUTPATIENT)
Dept: PRIMARY CARE CLINIC | Age: 47
End: 2022-03-15
Payer: COMMERCIAL

## 2022-03-15 VITALS
SYSTOLIC BLOOD PRESSURE: 118 MMHG | WEIGHT: 173.5 LBS | DIASTOLIC BLOOD PRESSURE: 70 MMHG | HEIGHT: 66 IN | HEART RATE: 100 BPM | OXYGEN SATURATION: 97 % | RESPIRATION RATE: 16 BRPM | BODY MASS INDEX: 27.88 KG/M2 | TEMPERATURE: 97.3 F

## 2022-03-15 DIAGNOSIS — R79.89 ELEVATED LFTS: Primary | ICD-10-CM

## 2022-03-15 DIAGNOSIS — I10 ESSENTIAL HYPERTENSION: ICD-10-CM

## 2022-03-15 DIAGNOSIS — E78.2 MIXED HYPERLIPIDEMIA: ICD-10-CM

## 2022-03-15 DIAGNOSIS — Z12.11 COLON CANCER SCREENING: ICD-10-CM

## 2022-03-15 PROCEDURE — 99213 OFFICE O/P EST LOW 20 MIN: CPT | Performed by: INTERNAL MEDICINE

## 2022-03-15 RX ORDER — ATORVASTATIN CALCIUM 20 MG/1
20 TABLET, FILM COATED ORAL DAILY
Qty: 90 TABLET | Refills: 0 | Status: CANCELLED | OUTPATIENT
Start: 2022-03-15

## 2022-03-15 RX ORDER — LOSARTAN POTASSIUM 50 MG/1
50 TABLET ORAL DAILY
Qty: 90 TABLET | Refills: 0 | Status: SHIPPED
Start: 2022-03-15 | End: 2022-08-02 | Stop reason: SDUPTHER

## 2022-03-15 RX ORDER — LEVONORGESTREL AND ETHINYL ESTRADIOL 6-5-10
KIT ORAL
COMMUNITY
Start: 2022-01-27 | End: 2022-03-15

## 2022-03-15 NOTE — PROGRESS NOTES
Chief Complaint   Patient presents with    Discuss Labs    Medication Refill    Other     Patient had a Pap done in June by Dr Ave Vance. Also she is due for Colonoscopy        HPI:  Patient is here for follow-up of hyperlipidemia and abnormal LFTs. Blood work was discussed with patient. LDL cholesterol is still elevated above 190 despite the fact the patient has been compliant with Lipitor 20 mg daily. Also her LFTs are elevated worse than before so we will stop Lipitor for now and check ultrasound of the liver and gallbladder to evaluate for steatohepatitis or gallbladder stone. She denies any current complaints of shortness of breath chest pains abdominal pain nausea vomiting or diarrhea. She drinks alcohol only socially when she goes out for dinner once every 1 to 2 weeks. She takes some to to tumeric and multivitamins so she was asked to discontinue that  He was advised to get screening colonoscopy and referral will be made    Past Medical History, Surgical History, and Family History has been reviewed and updated.     Review of Systems:  Constitutional:  No fever, no fatigue, no chills, no headaches, no weight change  Dermatology:  No rash, no mole, no dry or sensitive skin  ENT:  No cough, no sore throat, no sinus pain, no runny nose, no ear pain  Cardiology:  No chest pain, no palpitations, no leg edema, no shortness of breath, no PND  Gastroenterology:  No dysphagia, no abdominal pain, no nausea, no vomiting, no constipation, no diarrhea, no heartburn  Musculoskeletal:  No joint pain, no leg cramps, no back pain, no muscle aches  Respiratory:  No shortness of breath, no orthopnea, no wheezing, no RASMUSSEN, no hemoptysis  Urology:  No blood in the urine, no urinary frequency, no urinary incontinence, no urinary urgency, no nocturia, no dysuria    Vitals:    03/15/22 1007   BP: 118/70   Pulse: 100   Resp: 16   Temp: 97.3 °F (36.3 °C)   TempSrc: Temporal   SpO2: 97%   Weight: 173 lb 8 oz (78.7 kg)   Height: 5' 6\" (1.676 m)       General:  Patient alert and oriented x 3, NAD, pleasant  HEENT:  Atraumatic, normocephalic, PERRLA, EOMI, clear conjunctiva, TMs clear, nose-clear, throat - no erythema  Neck:  Supple, no goiter, no carotid bruits, no LAD  Lungs:  CTA   Heart:  RRR, no murmurs, gallops or rubs  Abdomen:  Soft/nt/nd, + bowel sounds  Extremities:  No clubbing, cyanosis or edema  Skin: unremarkable    Cholesterol, Total   Date Value Ref Range Status   03/11/2022 278 (H) 0 - 199 mg/dL Final     Triglycerides   Date Value Ref Range Status   03/11/2022 202 (H) 0 - 149 mg/dL Final     HDL   Date Value Ref Range Status   03/11/2022 44 >40 mg/dL Final     LDL Calculated   Date Value Ref Range Status   03/11/2022 194 (H) 0 - 99 mg/dL Final     VLDL Cholesterol Calculated   Date Value Ref Range Status   03/11/2022 40 mg/dL Final     Sodium   Date Value Ref Range Status   08/27/2021 140 132 - 146 mmol/L Final     Potassium   Date Value Ref Range Status   08/27/2021 4.4 3.5 - 5.0 mmol/L Final     Chloride   Date Value Ref Range Status   08/27/2021 102 98 - 107 mmol/L Final     CO2   Date Value Ref Range Status   08/27/2021 24 22 - 29 mmol/L Final     BUN   Date Value Ref Range Status   08/27/2021 17 6 - 20 mg/dL Final     CREATININE   Date Value Ref Range Status   08/27/2021 0.9 0.5 - 1.0 mg/dL Final     Glucose   Date Value Ref Range Status   08/27/2021 71 (L) 74 - 99 mg/dL Final     Calcium   Date Value Ref Range Status   08/27/2021 9.3 8.6 - 10.2 mg/dL Final     Total Protein   Date Value Ref Range Status   03/11/2022 7.1 6.4 - 8.3 g/dL Final     Albumin   Date Value Ref Range Status   03/11/2022 4.3 3.5 - 5.2 g/dL Final     Total Bilirubin   Date Value Ref Range Status   03/11/2022 0.3 0.0 - 1.2 mg/dL Final     Alkaline Phosphatase   Date Value Ref Range Status   03/11/2022 125 (H) 35 - 104 U/L Final     AST   Date Value Ref Range Status   03/11/2022 36 (H) 0 - 31 U/L Final     ALT   Date Value Ref Range Status 03/11/2022 85 (H) 0 - 32 U/L Final     GFR Non-   Date Value Ref Range Status   08/27/2021 >60 >=60 mL/min/1.73 Final     Comment:     Chronic Kidney Disease: less than 60 ml/min/1.73 sq.m. Kidney Failure: less than 15 ml/min/1.73 sq.m. Results valid for patients 18 years and older. GFR    Date Value Ref Range Status   08/27/2021 >60  Final        No results found. Assessment/Plan:      Outpatient Encounter Medications as of 3/15/2022   Medication Sig Dispense Refill    losartan (COZAAR) 50 MG tablet Take 1 tablet by mouth daily 90 tablet 0    atorvastatin (LIPITOR) 20 MG tablet Take 1 tablet by mouth daily 90 tablet 0    CALCIUM PO Take by mouth      Multiple Vitamin (MULTIVITAMIN ADULT PO) Take by mouth      Probiotic Product (PROBIOTIC PO) Take by mouth      Ascorbic Acid (VITAMIN C PO) Take by mouth      Cholecalciferol (VITAMIN D3) 50 MCG (2000 UT) CAPS Take by mouth daily      [DISCONTINUED] LEVONEST 50-30/75-40/ 125-30 MCG TABS take 1 tablet by mouth once daily (Patient not taking: Reported on 3/15/2022)      [DISCONTINUED] losartan (COZAAR) 50 MG tablet Take 1 tablet by mouth daily 30 tablet 2     No facility-administered encounter medications on file as of 3/15/2022. [facility] was seen today for discuss labs, medication refill and other. Diagnoses and all orders for this visit:    Elevated LFTs  -     US ABDOMEN COMPLETE; Future  -     Hepatic Function Panel; Future    Mixed hyperlipidemia    Essential hypertension  -     losartan (COZAAR) 50 MG tablet; Take 1 tablet by mouth daily    Colon cancer screening  -     DESTINI - Lisa Lakhani MD,Gastroenterology, [place]    Discontinue Lipitor  Check ultrasound of the abdomen and gallbladder  Repeat LFTs in 1 month and RTC then    There are no Patient Instructions on file for this visit.      On this date 3/15/2022 I have spent 25 minutes reviewing previous notes, test results and face to face with the patient discussing the diagnosis and importance of compliance with the treatment plan as well as documenting on the day of the visit.       Liliam Baldwin MD   3/15/22

## 2022-03-23 ENCOUNTER — HOSPITAL ENCOUNTER (OUTPATIENT)
Dept: ULTRASOUND IMAGING | Age: 47
Discharge: HOME | End: 2022-03-23
Payer: COMMERCIAL

## 2022-03-23 DIAGNOSIS — R79.89 ELEVATED LFTS: ICD-10-CM

## 2022-03-23 PROCEDURE — 76705 ECHO EXAM OF ABDOMEN: CPT

## 2022-04-14 DIAGNOSIS — R79.89 ELEVATED LFTS: ICD-10-CM

## 2022-04-14 LAB
ALBUMIN SERPL-MCNC: 4.7 G/DL (ref 3.5–5.2)
ALP BLD-CCNC: 123 U/L (ref 35–104)
ALT SERPL-CCNC: 31 U/L (ref 0–32)
AST SERPL-CCNC: 22 U/L (ref 0–31)
BILIRUB SERPL-MCNC: 0.3 MG/DL (ref 0–1.2)
BILIRUBIN DIRECT: <0.2 MG/DL (ref 0–0.3)
BILIRUBIN, INDIRECT: ABNORMAL MG/DL (ref 0–1)
TOTAL PROTEIN: 7.6 G/DL (ref 6.4–8.3)

## 2022-04-18 ENCOUNTER — OFFICE VISIT (OUTPATIENT)
Dept: PRIMARY CARE CLINIC | Age: 47
End: 2022-04-18
Payer: COMMERCIAL

## 2022-04-18 VITALS
HEART RATE: 92 BPM | SYSTOLIC BLOOD PRESSURE: 118 MMHG | TEMPERATURE: 96.9 F | BODY MASS INDEX: 27.93 KG/M2 | WEIGHT: 173.8 LBS | OXYGEN SATURATION: 94 % | HEIGHT: 66 IN | DIASTOLIC BLOOD PRESSURE: 72 MMHG

## 2022-04-18 DIAGNOSIS — R79.89 ELEVATED LFTS: Primary | ICD-10-CM

## 2022-04-18 DIAGNOSIS — I10 ESSENTIAL HYPERTENSION: ICD-10-CM

## 2022-04-18 DIAGNOSIS — E78.2 MIXED HYPERLIPIDEMIA: ICD-10-CM

## 2022-04-18 PROCEDURE — 99213 OFFICE O/P EST LOW 20 MIN: CPT | Performed by: INTERNAL MEDICINE

## 2022-04-18 RX ORDER — ATORVASTATIN CALCIUM 10 MG/1
10 TABLET, FILM COATED ORAL DAILY
Qty: 90 TABLET | Refills: 0 | Status: SHIPPED
Start: 2022-04-18 | End: 2022-11-02

## 2022-04-18 NOTE — PROGRESS NOTES
Chief Complaint   Patient presents with    Discuss Labs     Pt is here for test results and lab work that was drawn on 4/14, and these are on file for review.  Results       HPI:  Patient is here for follow-up of abnormal LFTs. She had stopped her vitamins and her Lipitor. She had an ultrasound of the liver which showed steatohepatitis without any cholecystitis. Blood work was discussed with patient, or her liver enzymes came back to normal within a month of stopping the medication. I had a long discussion with the patient regarding following a low-fat diet and trying to resume the medication at a lower dose to see if that might help with her hyperlipidemia since she has a strong family history of coronary artery disease. She agrees and understands. She denies any breath chest pain abdominal pain nausea vomiting or diarrhea. .    Past Medical History, Surgical History, and Family History has been reviewed and updated.     Review of Systems:  Constitutional:  No fever, no fatigue, no chills, no headaches, no weight change  Dermatology:  No rash, no mole, no dry or sensitive skin  ENT:  No cough, no sore throat, no sinus pain, no runny nose, no ear pain  Cardiology:  No chest pain, no palpitations, no leg edema, no shortness of breath, no PND  Gastroenterology:  No dysphagia, no abdominal pain, no nausea, no vomiting, no constipation, no diarrhea, no heartburn  Musculoskeletal:  No joint pain, no leg cramps, no back pain, no muscle aches  Respiratory:  No shortness of breath, no orthopnea, no wheezing, no RASMUSSEN, no hemoptysis  Urology:  No blood in the urine, no urinary frequency, no urinary incontinence, no urinary urgency, no nocturia, no dysuria    Vitals:    04/18/22 1155   BP: 118/72   Pulse: 92   Temp: 96.9 °F (36.1 °C)   TempSrc: Temporal   SpO2: 94%   Weight: 173 lb 12.8 oz (78.8 kg)   Height: 5' 6\" (1.676 m)       General:  Patient alert and oriented x 3, NAD, pleasant  HEENT:  Atraumatic, normocephalic, PERRLA, EOMI, clear conjunctiva, TMs clear, nose-clear, throat - no erythema  Neck:  Supple, no goiter, no carotid bruits, no LAD  Lungs:  CTA   Heart:  RRR, no murmurs, gallops or rubs  Abdomen:  Soft/nt/nd, + bowel sounds  Extremities:  No clubbing, cyanosis or edema  Skin: unremarkable    Cholesterol, Total   Date Value Ref Range Status   03/11/2022 278 (H) 0 - 199 mg/dL Final     Triglycerides   Date Value Ref Range Status   03/11/2022 202 (H) 0 - 149 mg/dL Final     HDL   Date Value Ref Range Status   03/11/2022 44 >40 mg/dL Final     LDL Calculated   Date Value Ref Range Status   03/11/2022 194 (H) 0 - 99 mg/dL Final     VLDL Cholesterol Calculated   Date Value Ref Range Status   03/11/2022 40 mg/dL Final     Sodium   Date Value Ref Range Status   08/27/2021 140 132 - 146 mmol/L Final     Potassium   Date Value Ref Range Status   08/27/2021 4.4 3.5 - 5.0 mmol/L Final     Chloride   Date Value Ref Range Status   08/27/2021 102 98 - 107 mmol/L Final     CO2   Date Value Ref Range Status   08/27/2021 24 22 - 29 mmol/L Final     BUN   Date Value Ref Range Status   08/27/2021 17 6 - 20 mg/dL Final     CREATININE   Date Value Ref Range Status   08/27/2021 0.9 0.5 - 1.0 mg/dL Final     Glucose   Date Value Ref Range Status   08/27/2021 71 (L) 74 - 99 mg/dL Final     Calcium   Date Value Ref Range Status   08/27/2021 9.3 8.6 - 10.2 mg/dL Final     Total Protein   Date Value Ref Range Status   04/14/2022 7.6 6.4 - 8.3 g/dL Final     Albumin   Date Value Ref Range Status   04/14/2022 4.7 3.5 - 5.2 g/dL Final     Total Bilirubin   Date Value Ref Range Status   04/14/2022 0.3 0.0 - 1.2 mg/dL Final     Alkaline Phosphatase   Date Value Ref Range Status   04/14/2022 123 (H) 35 - 104 U/L Final     AST   Date Value Ref Range Status   04/14/2022 22 0 - 31 U/L Final     ALT   Date Value Ref Range Status   04/14/2022 31 0 - 32 U/L Final     GFR Non-   Date Value Ref Range Status   08/27/2021 >60 >=60 mL/min/1.73 Final     Comment:     Chronic Kidney Disease: less than 60 ml/min/1.73 sq.m. Kidney Failure: less than 15 ml/min/1.73 sq.m. Results valid for patients 18 years and older. GFR    Date Value Ref Range Status   08/27/2021 >60  Final        US ABDOMEN LIMITED    Result Date: 3/23/2022  EXAMINATION: RIGHT UPPER QUADRANT ULTRASOUND 3/23/2022 8:23 am COMPARISON: None HISTORY: ORDERING SYSTEM PROVIDED HISTORY: Elevated LFTs TECHNOLOGIST PROVIDED HISTORY: What reading provider will be dictating this exam?->CRC FINDINGS: LIVER:  The liver demonstrates diffusely increased echogenicity without evidence of intrahepatic biliary ductal dilatation. BILIARY SYSTEM:  Gallbladder is unremarkable without evidence of pericholecystic fluid, wall thickening or stones. Negative sonographic Mejia's sign. Common bile duct is within normal limits measuring 5 mm. RIGHT KIDNEY: The right kidney is grossly unremarkable without evidence of hydronephrosis. PANCREAS:  Visualized portions of the pancreas are unremarkable. OTHER: No evidence of right upper quadrant ascites. Diffusely increased echogenicity throughout the hepatic parenchyma consistent with parenchymal disease process such as hepatic steatosis.  RECOMMENDATIONS: Unavailable        Assessment/Plan:      Outpatient Encounter Medications as of 4/18/2022   Medication Sig Dispense Refill    atorvastatin (LIPITOR) 10 MG tablet Take 1 tablet by mouth daily 90 tablet 0    losartan (COZAAR) 50 MG tablet Take 1 tablet by mouth daily 90 tablet 0    Multiple Vitamin (MULTIVITAMIN ADULT PO) Take by mouth      Probiotic Product (PROBIOTIC PO) Take by mouth      [DISCONTINUED] atorvastatin (LIPITOR) 20 MG tablet Take 1 tablet by mouth daily (Patient not taking: Reported on 4/18/2022) 90 tablet 0    Cholecalciferol (VITAMIN D3) 50 MCG (2000 UT) CAPS Take by mouth daily (Patient not taking: Reported on 4/18/2022)      [DISCONTINUED] CALCIUM PO Take by mouth (Patient not taking: Reported on 4/18/2022)      [DISCONTINUED] Ascorbic Acid (VITAMIN C PO) Take by mouth (Patient not taking: Reported on 4/18/2022)       No facility-administered encounter medications on file as of 4/18/2022. Spencer Xiong was seen today for discuss labs and results. Diagnoses and all orders for this visit:    Elevated LFTs    Mixed hyperlipidemia  -     atorvastatin (LIPITOR) 10 MG tablet; Take 1 tablet by mouth daily  -     Comprehensive Metabolic Panel; Future  -     Lipid Panel; Future    Essential hypertension         There are no Patient Instructions on file for this visit. On this date 4/18/2022 I have spent 25 minutes reviewing previous notes, test results and face to face with the patient discussing the diagnosis and importance of compliance with the treatment plan as well as documenting on the day of the visit.       Jayro Pelaez MD   4/18/22

## 2022-07-19 DIAGNOSIS — E78.2 MIXED HYPERLIPIDEMIA: ICD-10-CM

## 2022-07-19 LAB
ALBUMIN SERPL-MCNC: 4.1 G/DL (ref 3.5–5.2)
ALP BLD-CCNC: 120 U/L (ref 35–104)
ALT SERPL-CCNC: 11 U/L (ref 0–32)
ANION GAP SERPL CALCULATED.3IONS-SCNC: 13 MMOL/L (ref 7–16)
AST SERPL-CCNC: 16 U/L (ref 0–31)
BILIRUB SERPL-MCNC: 0.3 MG/DL (ref 0–1.2)
BUN BLDV-MCNC: 14 MG/DL (ref 6–20)
CALCIUM SERPL-MCNC: 9.3 MG/DL (ref 8.6–10.2)
CHLORIDE BLD-SCNC: 101 MMOL/L (ref 98–107)
CHOLESTEROL, TOTAL: 296 MG/DL (ref 0–199)
CO2: 23 MMOL/L (ref 22–29)
CREAT SERPL-MCNC: 0.9 MG/DL (ref 0.5–1)
GFR AFRICAN AMERICAN: >60
GFR NON-AFRICAN AMERICAN: >60 ML/MIN/1.73
GLUCOSE BLD-MCNC: 77 MG/DL (ref 74–99)
HDLC SERPL-MCNC: 47 MG/DL
LDL CHOLESTEROL CALCULATED: 218 MG/DL (ref 0–99)
POTASSIUM SERPL-SCNC: 4 MMOL/L (ref 3.5–5)
SODIUM BLD-SCNC: 137 MMOL/L (ref 132–146)
TOTAL PROTEIN: 7 G/DL (ref 6.4–8.3)
TRIGL SERPL-MCNC: 156 MG/DL (ref 0–149)
VLDLC SERPL CALC-MCNC: 31 MG/DL

## 2022-08-02 ENCOUNTER — OFFICE VISIT (OUTPATIENT)
Dept: PRIMARY CARE CLINIC | Age: 47
End: 2022-08-02
Payer: COMMERCIAL

## 2022-08-02 ENCOUNTER — TELEPHONE (OUTPATIENT)
Dept: PRIMARY CARE CLINIC | Age: 47
End: 2022-08-02

## 2022-08-02 VITALS
BODY MASS INDEX: 26.49 KG/M2 | WEIGHT: 164.1 LBS | TEMPERATURE: 97.2 F | DIASTOLIC BLOOD PRESSURE: 68 MMHG | OXYGEN SATURATION: 98 % | HEART RATE: 84 BPM | SYSTOLIC BLOOD PRESSURE: 114 MMHG

## 2022-08-02 DIAGNOSIS — I10 ESSENTIAL HYPERTENSION: Primary | ICD-10-CM

## 2022-08-02 DIAGNOSIS — Z11.59 NEED FOR HEPATITIS C SCREENING TEST: ICD-10-CM

## 2022-08-02 DIAGNOSIS — E78.2 MIXED HYPERLIPIDEMIA: ICD-10-CM

## 2022-08-02 PROCEDURE — 99213 OFFICE O/P EST LOW 20 MIN: CPT | Performed by: INTERNAL MEDICINE

## 2022-08-02 RX ORDER — LOSARTAN POTASSIUM 50 MG/1
50 TABLET ORAL DAILY
Qty: 90 TABLET | Refills: 0 | Status: SHIPPED
Start: 2022-08-02 | End: 2022-11-02 | Stop reason: SDUPTHER

## 2022-08-02 RX ORDER — ATORVASTATIN CALCIUM 10 MG/1
10 TABLET, FILM COATED ORAL DAILY
Qty: 90 TABLET | Refills: 0 | Status: CANCELLED | OUTPATIENT
Start: 2022-08-02

## 2022-08-02 RX ORDER — ROSUVASTATIN CALCIUM 40 MG/1
40 TABLET, COATED ORAL EVERY EVENING
Qty: 30 TABLET | Refills: 3
Start: 2022-08-02 | End: 2022-08-03 | Stop reason: SDUPTHER

## 2022-08-02 SDOH — ECONOMIC STABILITY: FOOD INSECURITY: WITHIN THE PAST 12 MONTHS, THE FOOD YOU BOUGHT JUST DIDN'T LAST AND YOU DIDN'T HAVE MONEY TO GET MORE.: NEVER TRUE

## 2022-08-02 SDOH — ECONOMIC STABILITY: FOOD INSECURITY: WITHIN THE PAST 12 MONTHS, YOU WORRIED THAT YOUR FOOD WOULD RUN OUT BEFORE YOU GOT MONEY TO BUY MORE.: NEVER TRUE

## 2022-08-02 ASSESSMENT — ANXIETY QUESTIONNAIRES
6. BECOMING EASILY ANNOYED OR IRRITABLE: 0
5. BEING SO RESTLESS THAT IT IS HARD TO SIT STILL: 0
4. TROUBLE RELAXING: 0
1. FEELING NERVOUS, ANXIOUS, OR ON EDGE: 0
2. NOT BEING ABLE TO STOP OR CONTROL WORRYING: 0
3. WORRYING TOO MUCH ABOUT DIFFERENT THINGS: 0
7. FEELING AFRAID AS IF SOMETHING AWFUL MIGHT HAPPEN: 0
IF YOU CHECKED OFF ANY PROBLEMS ON THIS QUESTIONNAIRE, HOW DIFFICULT HAVE THESE PROBLEMS MADE IT FOR YOU TO DO YOUR WORK, TAKE CARE OF THINGS AT HOME, OR GET ALONG WITH OTHER PEOPLE: NOT DIFFICULT AT ALL
GAD7 TOTAL SCORE: 0

## 2022-08-02 ASSESSMENT — PATIENT HEALTH QUESTIONNAIRE - PHQ9
1. LITTLE INTEREST OR PLEASURE IN DOING THINGS: 0
SUM OF ALL RESPONSES TO PHQ9 QUESTIONS 1 & 2: 0
SUM OF ALL RESPONSES TO PHQ QUESTIONS 1-9: 0
2. FEELING DOWN, DEPRESSED OR HOPELESS: 0
SUM OF ALL RESPONSES TO PHQ QUESTIONS 1-9: 0

## 2022-08-02 ASSESSMENT — SOCIAL DETERMINANTS OF HEALTH (SDOH): HOW HARD IS IT FOR YOU TO PAY FOR THE VERY BASICS LIKE FOOD, HOUSING, MEDICAL CARE, AND HEATING?: NOT HARD AT ALL

## 2022-08-02 NOTE — TELEPHONE ENCOUNTER
I contacted [facility] and was told by pharmacist that they didn't receive the Crestor script, and I noticed it said no print. .. Please advise.

## 2022-08-02 NOTE — PROGRESS NOTES
Chief Complaint   Patient presents with    Hyperlipidemia     Pt is here as a F/U for her labs that were drawn in July and these are on file for review. No current c/o at this time. HPI:  Patient is here for follow-up of pretension and hyperlipidemia. She is doing well denying any current complaints of any shortness of breath chest pains abdominal pains nausea vomiting or diarrhea. She has been compliant with medications but despite that her LDL cholesterol is above 200. Her triglyceride level came down to 158 and she had lost about 9 pounds as she is trying hard with her diet. We will discontinue Lipitor and start her on Crestor 40 mg daily. She is scheduled for her colonoscopy next week  She is scheduled for her pelvic and Pap exam next month    Past Medical History, Surgical History, and Family History has been reviewed and updated.     Review of Systems:  Constitutional:  No fever, no fatigue, no chills, no headaches, no weight change  Dermatology:  No rash, no mole, no dry or sensitive skin  ENT:  No cough, no sore throat, no sinus pain, no runny nose, no ear pain  Cardiology:  No chest pain, no palpitations, no leg edema, no shortness of breath, no PND  Gastroenterology:  No dysphagia, no abdominal pain, no nausea, no vomiting, no constipation, no diarrhea, no heartburn  Musculoskeletal:  No joint pain, no leg cramps, no back pain, no muscle aches  Respiratory:  No shortness of breath, no orthopnea, no wheezing, no RASMUSSEN, no hemoptysis  Urology:  No blood in the urine, no urinary frequency, no urinary incontinence, no urinary urgency, no nocturia, no dysuria    Vitals:    08/02/22 0915   BP: 114/68   Pulse: 84   Temp: 97.2 °F (36.2 °C)   TempSrc: Temporal   SpO2: 98%   Weight: 164 lb 1.6 oz (74.4 kg)       General:  Patient alert and oriented x 3, NAD, pleasant  HEENT:  Atraumatic, normocephalic, PERRLA, EOMI, clear conjunctiva, TMs clear, nose-clear, throat - no erythema  Neck:  Supple, no goiter, no carotid bruits, no LAD  Lungs:  CTA   Heart:  RRR, no murmurs, gallops or rubs  Abdomen:  Soft/nt/nd, + bowel sounds  Extremities:  No clubbing, cyanosis or edema  Skin: unremarkable    Cholesterol, Total   Date Value Ref Range Status   07/19/2022 296 (H) 0 - 199 mg/dL Final     Triglycerides   Date Value Ref Range Status   07/19/2022 156 (H) 0 - 149 mg/dL Final     HDL   Date Value Ref Range Status   07/19/2022 47 >40 mg/dL Final     LDL Calculated   Date Value Ref Range Status   07/19/2022 218 (H) 0 - 99 mg/dL Final     VLDL Cholesterol Calculated   Date Value Ref Range Status   07/19/2022 31 mg/dL Final     Sodium   Date Value Ref Range Status   07/19/2022 137 132 - 146 mmol/L Final     Potassium   Date Value Ref Range Status   07/19/2022 4.0 3.5 - 5.0 mmol/L Final     Chloride   Date Value Ref Range Status   07/19/2022 101 98 - 107 mmol/L Final     CO2   Date Value Ref Range Status   07/19/2022 23 22 - 29 mmol/L Final     BUN   Date Value Ref Range Status   07/19/2022 14 6 - 20 mg/dL Final     Creatinine   Date Value Ref Range Status   07/19/2022 0.9 0.5 - 1.0 mg/dL Final     Glucose   Date Value Ref Range Status   07/19/2022 77 74 - 99 mg/dL Final     Calcium   Date Value Ref Range Status   07/19/2022 9.3 8.6 - 10.2 mg/dL Final     Total Protein   Date Value Ref Range Status   07/19/2022 7.0 6.4 - 8.3 g/dL Final     Albumin   Date Value Ref Range Status   07/19/2022 4.1 3.5 - 5.2 g/dL Final     Total Bilirubin   Date Value Ref Range Status   07/19/2022 0.3 0.0 - 1.2 mg/dL Final     Alkaline Phosphatase   Date Value Ref Range Status   07/19/2022 120 (H) 35 - 104 U/L Final     AST   Date Value Ref Range Status   07/19/2022 16 0 - 31 U/L Final     ALT   Date Value Ref Range Status   07/19/2022 11 0 - 32 U/L Final     GFR Non-   Date Value Ref Range Status   07/19/2022 >60 >=60 mL/min/1.73 Final     Comment:     Chronic Kidney Disease: less than 60 ml/min/1.73 sq.m.           Kidney Failure: less

## 2022-08-03 DIAGNOSIS — E78.2 MIXED HYPERLIPIDEMIA: ICD-10-CM

## 2022-08-03 RX ORDER — ROSUVASTATIN CALCIUM 40 MG/1
40 TABLET, COATED ORAL EVERY EVENING
Qty: 30 TABLET | Refills: 3 | Status: SHIPPED
Start: 2022-08-03 | End: 2022-11-02 | Stop reason: SDUPTHER

## 2022-10-27 DIAGNOSIS — E78.2 MIXED HYPERLIPIDEMIA: ICD-10-CM

## 2022-10-27 DIAGNOSIS — I10 ESSENTIAL HYPERTENSION: ICD-10-CM

## 2022-10-27 DIAGNOSIS — Z11.59 NEED FOR HEPATITIS C SCREENING TEST: ICD-10-CM

## 2022-10-28 LAB
ALBUMIN SERPL-MCNC: 4.5 G/DL (ref 3.5–5.2)
ALP BLD-CCNC: 100 U/L (ref 35–104)
ALT SERPL-CCNC: 14 U/L (ref 0–32)
ANION GAP SERPL CALCULATED.3IONS-SCNC: 16 MMOL/L (ref 7–16)
AST SERPL-CCNC: 17 U/L (ref 0–31)
BILIRUB SERPL-MCNC: 0.4 MG/DL (ref 0–1.2)
BUN BLDV-MCNC: 13 MG/DL (ref 6–20)
CALCIUM SERPL-MCNC: 10.2 MG/DL (ref 8.6–10.2)
CHLORIDE BLD-SCNC: 109 MMOL/L (ref 98–107)
CHOLESTEROL, TOTAL: 224 MG/DL (ref 0–199)
CO2: 19 MMOL/L (ref 22–29)
CREAT SERPL-MCNC: 0.9 MG/DL (ref 0.5–1)
GFR SERPL CREATININE-BSD FRML MDRD: >60 ML/MIN/1.73
GLUCOSE BLD-MCNC: 81 MG/DL (ref 74–99)
HDLC SERPL-MCNC: 41 MG/DL
HEPATITIS C ANTIBODY INTERPRETATION: NORMAL
LDL CHOLESTEROL CALCULATED: 162 MG/DL (ref 0–99)
POTASSIUM SERPL-SCNC: 4.8 MMOL/L (ref 3.5–5)
SODIUM BLD-SCNC: 144 MMOL/L (ref 132–146)
TOTAL PROTEIN: 7.3 G/DL (ref 6.4–8.3)
TRIGL SERPL-MCNC: 104 MG/DL (ref 0–149)
VLDLC SERPL CALC-MCNC: 21 MG/DL

## 2022-11-02 ENCOUNTER — OFFICE VISIT (OUTPATIENT)
Dept: PRIMARY CARE CLINIC | Age: 47
End: 2022-11-02
Payer: COMMERCIAL

## 2022-11-02 VITALS
WEIGHT: 149.4 LBS | HEIGHT: 66 IN | DIASTOLIC BLOOD PRESSURE: 78 MMHG | OXYGEN SATURATION: 94 % | BODY MASS INDEX: 24.01 KG/M2 | HEART RATE: 88 BPM | TEMPERATURE: 97.5 F | SYSTOLIC BLOOD PRESSURE: 118 MMHG

## 2022-11-02 DIAGNOSIS — I10 ESSENTIAL HYPERTENSION: ICD-10-CM

## 2022-11-02 DIAGNOSIS — E78.2 MIXED HYPERLIPIDEMIA: ICD-10-CM

## 2022-11-02 PROCEDURE — 3078F DIAST BP <80 MM HG: CPT | Performed by: INTERNAL MEDICINE

## 2022-11-02 PROCEDURE — 99213 OFFICE O/P EST LOW 20 MIN: CPT | Performed by: INTERNAL MEDICINE

## 2022-11-02 PROCEDURE — 3074F SYST BP LT 130 MM HG: CPT | Performed by: INTERNAL MEDICINE

## 2022-11-02 RX ORDER — ROSUVASTATIN CALCIUM 40 MG/1
40 TABLET, COATED ORAL EVERY EVENING
Qty: 90 TABLET | Refills: 1 | Status: SHIPPED | OUTPATIENT
Start: 2022-11-02

## 2022-11-02 RX ORDER — LOSARTAN POTASSIUM 50 MG/1
50 TABLET ORAL DAILY
Qty: 90 TABLET | Refills: 1 | Status: SHIPPED | OUTPATIENT
Start: 2022-11-02

## 2022-11-02 RX ORDER — DROSPIRENONE 4 MG/1
1 TABLET, FILM COATED ORAL DAILY
COMMUNITY
Start: 2022-08-18

## 2022-11-02 NOTE — PROGRESS NOTES
Chief Complaint   Patient presents with    3 Month Follow-Up     Pt is here as a 3 month F/U, and had labs drawn on 10/27 that are available for review. No new concerns at this time. HPI:  Patient is here for follow-up of hypertension and hyperlipidemia. Patient is doing very well without any new complaints. As a matter fact she has been trying very hard with diet and losing the weight and she has lost almost about 25 pounds. Blood work was discussed with patient appears all within reasonable range LDL cholesterol came down from more than 200-162. Liver enzymes are within normal limits. Patient denies any side effects from the medication. Her blood pressure is very well controlled and she was counseled that if she continues on the weight loss spree then we will try to decrease her losartan to half the dose. She had her Pap smear and her mammogram 2-month ago  She had her colonoscopy 3-month ago. .    Past Medical History, Surgical History, and Family History has been reviewed and updated.     Review of Systems:  Constitutional:  No fever, no fatigue, no chills, no headaches, no weight change  Dermatology:  No rash, no mole, no dry or sensitive skin  ENT:  No cough, no sore throat, no sinus pain, no runny nose, no ear pain  Cardiology:  No chest pain, no palpitations, no leg edema, no shortness of breath, no PND  Gastroenterology:  No dysphagia, no abdominal pain, no nausea, no vomiting, no constipation, no diarrhea, no heartburn  Musculoskeletal:  No joint pain, no leg cramps, no back pain, no muscle aches  Respiratory:  No shortness of breath, no orthopnea, no wheezing, no RASMUSSEN, no hemoptysis  Urology:  No blood in the urine, no urinary frequency, no urinary incontinence, no urinary urgency, no nocturia, no dysuria    Vitals:    11/02/22 0916   BP: 118/78   Pulse: 88   Temp: 97.5 °F (36.4 °C)   TempSrc: Temporal   SpO2: 94%   Weight: 149 lb 6.4 oz (67.8 kg)   Height: 5' 6\" (1.676 m)       General: Patient alert and oriented x 3, NAD, pleasant  HEENT:  Atraumatic, normocephalic, PERRLA, EOMI, clear conjunctiva, TMs clear, nose-clear, throat - no erythema  Neck:  Supple, no goiter, no carotid bruits, no LAD  Lungs:  CTA   Heart:  RRR, no murmurs, gallops or rubs  Abdomen:  Soft/nt/nd, + bowel sounds  Lymph node examination: unremarkable  Neurological exam : unremarkable  Extremities:  No clubbing, cyanosis or edema  Skin: unremarkable    Cholesterol, Total   Date Value Ref Range Status   10/27/2022 224 (H) 0 - 199 mg/dL Final     Triglycerides   Date Value Ref Range Status   10/27/2022 104 0 - 149 mg/dL Final     HDL   Date Value Ref Range Status   10/27/2022 41 >40 mg/dL Final     LDL Calculated   Date Value Ref Range Status   10/27/2022 162 (H) 0 - 99 mg/dL Final     VLDL Cholesterol Calculated   Date Value Ref Range Status   10/27/2022 21 mg/dL Final     Sodium   Date Value Ref Range Status   10/27/2022 144 132 - 146 mmol/L Final     Potassium   Date Value Ref Range Status   10/27/2022 4.8 3.5 - 5.0 mmol/L Final     Chloride   Date Value Ref Range Status   10/27/2022 109 (H) 98 - 107 mmol/L Final     CO2   Date Value Ref Range Status   10/27/2022 19 (L) 22 - 29 mmol/L Final     BUN   Date Value Ref Range Status   10/27/2022 13 6 - 20 mg/dL Final     Creatinine   Date Value Ref Range Status   10/27/2022 0.9 0.5 - 1.0 mg/dL Final     Glucose   Date Value Ref Range Status   10/27/2022 81 74 - 99 mg/dL Final     Calcium   Date Value Ref Range Status   10/27/2022 10.2 8.6 - 10.2 mg/dL Final     Total Protein   Date Value Ref Range Status   10/27/2022 7.3 6.4 - 8.3 g/dL Final     Albumin   Date Value Ref Range Status   10/27/2022 4.5 3.5 - 5.2 g/dL Final     Total Bilirubin   Date Value Ref Range Status   10/27/2022 0.4 0.0 - 1.2 mg/dL Final     Alkaline Phosphatase   Date Value Ref Range Status   10/27/2022 100 35 - 104 U/L Final     AST   Date Value Ref Range Status   10/27/2022 17 0 - 31 U/L Final     ALT Date Value Ref Range Status   10/27/2022 14 0 - 32 U/L Final     Est, Glom Filt Rate   Date Value Ref Range Status   10/27/2022 >60 >=60 mL/min/1.73 Final     Comment:     Pediatric calculator link  [email or web address] [email or web address]_calculatorped  Effective Oct 3, 2022  These results are not intended for use in patients  <25years of age. eGFR results are calculated without  a race factor using the 2021 CKD-EPI equation. Careful  clinical correlation is recommended, particularly when  comparing to results calculated using previous equations. The CKD-EPI equation is less accurate in patients with  extremes of muscle mass, extra-renal metabolism of  creatinine, excessive creatinine ingestion, or following  therapy that affects renal tubular secretion. GFR    Date Value Ref Range Status   07/19/2022 >60  Final        No results found. Assessment/Plan:      Outpatient Encounter Medications as of 11/2/2022   Medication Sig Dispense Refill    SLYND 4 MG TABS Take 1 tablet by mouth daily      CRESTOR 40 MG tablet Take 1 tablet by mouth every evening 90 tablet 1    losartan (COZAAR) 50 MG tablet Take 1 tablet by mouth daily 90 tablet 1    [DISCONTINUED] CRESTOR 40 MG tablet Take 1 tablet by mouth every evening 30 tablet 3    [DISCONTINUED] losartan (COZAAR) 50 MG tablet Take 1 tablet by mouth in the morning. 90 tablet 0    [DISCONTINUED] atorvastatin (LIPITOR) 10 MG tablet Take 1 tablet by mouth daily (Patient not taking: Reported on 11/2/2022) 90 tablet 0     No facility-administered encounter medications on file as of 11/2/2022Xiomara BEAVER was seen today for 3 month follow-up. Diagnoses and all orders for this visit:    Mixed hyperlipidemia  -     CRESTOR 40 MG tablet; Take 1 tablet by mouth every evening  -     Lipid Panel; Future    Essential hypertension  -     losartan (COZAAR) 50 MG tablet; Take 1 tablet by mouth daily  -     Comprehensive Metabolic Panel;  Future There are no Patient Instructions on file for this visit. On this date 11/2/2022 I have spent 25 minutes reviewing previous notes, test results and face to face with the patient discussing the diagnosis and importance of compliance with the treatment plan as well as documenting on the day of the visit.       Cele Dumont MD   11/2/22

## 2023-03-14 DIAGNOSIS — E78.2 MIXED HYPERLIPIDEMIA: ICD-10-CM

## 2023-03-14 DIAGNOSIS — I10 ESSENTIAL HYPERTENSION: ICD-10-CM

## 2023-03-14 LAB
ALBUMIN SERPL-MCNC: 4.1 G/DL (ref 3.5–5.2)
ALP BLD-CCNC: 67 U/L (ref 35–104)
ALT SERPL-CCNC: 6 U/L (ref 0–32)
ANION GAP SERPL CALCULATED.3IONS-SCNC: 11 MMOL/L (ref 7–16)
AST SERPL-CCNC: 14 U/L (ref 0–31)
BILIRUB SERPL-MCNC: 0.2 MG/DL (ref 0–1.2)
BUN BLDV-MCNC: 10 MG/DL (ref 6–20)
CALCIUM SERPL-MCNC: 9.1 MG/DL (ref 8.6–10.2)
CHLORIDE BLD-SCNC: 106 MMOL/L (ref 98–107)
CHOLESTEROL, TOTAL: 209 MG/DL (ref 0–199)
CO2: 24 MMOL/L (ref 22–29)
CREAT SERPL-MCNC: 1 MG/DL (ref 0.5–1)
GFR SERPL CREATININE-BSD FRML MDRD: >60 ML/MIN/1.73
GLUCOSE BLD-MCNC: 85 MG/DL (ref 74–99)
HDLC SERPL-MCNC: 46 MG/DL
LDL CHOLESTEROL CALCULATED: 140 MG/DL (ref 0–99)
POTASSIUM SERPL-SCNC: 4.5 MMOL/L (ref 3.5–5)
SODIUM BLD-SCNC: 141 MMOL/L (ref 132–146)
TOTAL PROTEIN: 6.7 G/DL (ref 6.4–8.3)
TRIGL SERPL-MCNC: 115 MG/DL (ref 0–149)
VLDLC SERPL CALC-MCNC: 23 MG/DL

## 2023-03-27 ENCOUNTER — OFFICE VISIT (OUTPATIENT)
Dept: PRIMARY CARE CLINIC | Age: 48
End: 2023-03-27
Payer: COMMERCIAL

## 2023-03-27 VITALS
HEIGHT: 66 IN | SYSTOLIC BLOOD PRESSURE: 122 MMHG | HEART RATE: 80 BPM | OXYGEN SATURATION: 99 % | BODY MASS INDEX: 22.18 KG/M2 | DIASTOLIC BLOOD PRESSURE: 72 MMHG | WEIGHT: 138 LBS | TEMPERATURE: 97.5 F

## 2023-03-27 DIAGNOSIS — I10 ESSENTIAL HYPERTENSION: ICD-10-CM

## 2023-03-27 DIAGNOSIS — R63.4 WEIGHT LOSS: ICD-10-CM

## 2023-03-27 DIAGNOSIS — E78.2 MIXED HYPERLIPIDEMIA: Primary | ICD-10-CM

## 2023-03-27 PROCEDURE — 3074F SYST BP LT 130 MM HG: CPT | Performed by: INTERNAL MEDICINE

## 2023-03-27 PROCEDURE — 3078F DIAST BP <80 MM HG: CPT | Performed by: INTERNAL MEDICINE

## 2023-03-27 PROCEDURE — 99213 OFFICE O/P EST LOW 20 MIN: CPT | Performed by: INTERNAL MEDICINE

## 2023-03-27 SDOH — ECONOMIC STABILITY: FOOD INSECURITY: WITHIN THE PAST 12 MONTHS, YOU WORRIED THAT YOUR FOOD WOULD RUN OUT BEFORE YOU GOT MONEY TO BUY MORE.: NEVER TRUE

## 2023-03-27 SDOH — ECONOMIC STABILITY: INCOME INSECURITY: HOW HARD IS IT FOR YOU TO PAY FOR THE VERY BASICS LIKE FOOD, HOUSING, MEDICAL CARE, AND HEATING?: NOT HARD AT ALL

## 2023-03-27 SDOH — ECONOMIC STABILITY: FOOD INSECURITY: WITHIN THE PAST 12 MONTHS, THE FOOD YOU BOUGHT JUST DIDN'T LAST AND YOU DIDN'T HAVE MONEY TO GET MORE.: NEVER TRUE

## 2023-03-27 SDOH — ECONOMIC STABILITY: HOUSING INSECURITY
IN THE LAST 12 MONTHS, WAS THERE A TIME WHEN YOU DID NOT HAVE A STEADY PLACE TO SLEEP OR SLEPT IN A SHELTER (INCLUDING NOW)?: NO

## 2023-03-27 ASSESSMENT — PATIENT HEALTH QUESTIONNAIRE - PHQ9
2. FEELING DOWN, DEPRESSED OR HOPELESS: 0
SUM OF ALL RESPONSES TO PHQ QUESTIONS 1-9: 0
SUM OF ALL RESPONSES TO PHQ QUESTIONS 1-9: 0
1. LITTLE INTEREST OR PLEASURE IN DOING THINGS: 0
SUM OF ALL RESPONSES TO PHQ QUESTIONS 1-9: 0
SUM OF ALL RESPONSES TO PHQ9 QUESTIONS 1 & 2: 0
SUM OF ALL RESPONSES TO PHQ QUESTIONS 1-9: 0

## 2023-03-27 ASSESSMENT — ANXIETY QUESTIONNAIRES
6. BECOMING EASILY ANNOYED OR IRRITABLE: 0
1. FEELING NERVOUS, ANXIOUS, OR ON EDGE: 0
7. FEELING AFRAID AS IF SOMETHING AWFUL MIGHT HAPPEN: 0
2. NOT BEING ABLE TO STOP OR CONTROL WORRYING: 0
GAD7 TOTAL SCORE: 0
IF YOU CHECKED OFF ANY PROBLEMS ON THIS QUESTIONNAIRE, HOW DIFFICULT HAVE THESE PROBLEMS MADE IT FOR YOU TO DO YOUR WORK, TAKE CARE OF THINGS AT HOME, OR GET ALONG WITH OTHER PEOPLE: NOT DIFFICULT AT ALL
5. BEING SO RESTLESS THAT IT IS HARD TO SIT STILL: 0
3. WORRYING TOO MUCH ABOUT DIFFERENT THINGS: 0
4. TROUBLE RELAXING: 0

## 2023-03-27 NOTE — PROGRESS NOTES
Chief Complaint   Patient presents with    Discuss Labs     Pt is here as a routine visit, and had labs drawn on 3/14 that are available for review. HPI:  Patient is here for follow-up of hypertension and hyperlipidemia and weight loss. Patient is doing very well without any new complaints. She has been compliant with medication but she had stopped her losartan about 5 months ago as she has been continuing to lose the weight and she was getting dizzy. She lost a total of 40 pounds over the course of the last year. She has been controlling her portions according to her. Blood work was discussed with patient all appears within normal limits except for mild hyperlipidemia and she has been compliant with her Crestor 40 mg daily. Her last Pap smear was June 2022 with gynecology. Blood pressure today is very well controlled off medications. We will check lipid panel in 6-month. Past Medical History, Surgical History, and Family History has been reviewed and updated.     Review of Systems:  Constitutional:  No fever, no fatigue, no chills, no headaches, no weight change  Dermatology:  No rash, no mole, no dry or sensitive skin  ENT:  No cough, no sore throat, no sinus pain, no runny nose, no ear pain  Cardiology:  No chest pain, no palpitations, no leg edema, no shortness of breath, no PND  Gastroenterology:  No dysphagia, no abdominal pain, no nausea, no vomiting, no constipation, no diarrhea, no heartburn  Musculoskeletal:  No joint pain, no leg cramps, no back pain, no muscle aches  Respiratory:  No shortness of breath, no orthopnea, no wheezing, no RASMUSSEN, no hemoptysis  Urology:  No blood in the urine, no urinary frequency, no urinary incontinence, no urinary urgency, no nocturia, no dysuria    Vitals:    03/27/23 1019   BP: 122/72   Pulse: 80   Temp: 97.5 °F (36.4 °C)   TempSrc: Temporal   SpO2: 99%   Weight: 138 lb (62.6 kg)   Height: 5' 6\" (1.676 m)       General:  Patient alert and oriented x 3,

## 2023-06-09 DIAGNOSIS — E78.2 MIXED HYPERLIPIDEMIA: ICD-10-CM

## 2023-06-10 RX ORDER — ROSUVASTATIN CALCIUM 40 MG/1
40 TABLET, FILM COATED ORAL EVERY EVENING
Qty: 90 TABLET | Refills: 1 | Status: SHIPPED | OUTPATIENT
Start: 2023-06-10

## 2023-09-25 DIAGNOSIS — R63.4 WEIGHT LOSS: ICD-10-CM

## 2023-09-25 DIAGNOSIS — E78.2 MIXED HYPERLIPIDEMIA: ICD-10-CM

## 2023-09-25 DIAGNOSIS — I10 ESSENTIAL HYPERTENSION: ICD-10-CM

## 2023-09-25 LAB
ALBUMIN SERPL-MCNC: 3.9 G/DL (ref 3.5–5.2)
ALP BLD-CCNC: 52 U/L (ref 35–104)
ALT SERPL-CCNC: 8 U/L (ref 0–32)
ANION GAP SERPL CALCULATED.3IONS-SCNC: 11 MMOL/L (ref 7–16)
AST SERPL-CCNC: 15 U/L (ref 0–31)
BILIRUB SERPL-MCNC: 0.3 MG/DL (ref 0–1.2)
BUN BLDV-MCNC: 10 MG/DL (ref 6–20)
CALCIUM SERPL-MCNC: 8.7 MG/DL (ref 8.6–10.2)
CHLORIDE BLD-SCNC: 106 MMOL/L (ref 98–107)
CHOLESTEROL: 196 MG/DL
CO2: 22 MMOL/L (ref 22–29)
CREAT SERPL-MCNC: 1 MG/DL (ref 0.5–1)
GFR SERPL CREATININE-BSD FRML MDRD: >60 ML/MIN/1.73M2
GLUCOSE BLD-MCNC: 79 MG/DL (ref 74–99)
HDLC SERPL-MCNC: 47 MG/DL
LDL CHOLESTEROL: 131 MG/DL
POTASSIUM SERPL-SCNC: 4.2 MMOL/L (ref 3.5–5)
SODIUM BLD-SCNC: 139 MMOL/L (ref 132–146)
TOTAL PROTEIN: 6.5 G/DL (ref 6.4–8.3)
TRIGL SERPL-MCNC: 92 MG/DL
TSH SERPL DL<=0.05 MIU/L-ACNC: 2.07 UIU/ML (ref 0.27–4.2)
VLDLC SERPL CALC-MCNC: 18 MG/DL

## 2023-09-27 ENCOUNTER — OFFICE VISIT (OUTPATIENT)
Dept: PRIMARY CARE CLINIC | Age: 48
End: 2023-09-27
Payer: COMMERCIAL

## 2023-09-27 VITALS
SYSTOLIC BLOOD PRESSURE: 112 MMHG | TEMPERATURE: 97.1 F | OXYGEN SATURATION: 99 % | HEART RATE: 85 BPM | WEIGHT: 130.1 LBS | BODY MASS INDEX: 20.91 KG/M2 | DIASTOLIC BLOOD PRESSURE: 74 MMHG | HEIGHT: 66 IN

## 2023-09-27 DIAGNOSIS — Z80.3 FAMILY HISTORY OF BREAST CANCER: ICD-10-CM

## 2023-09-27 DIAGNOSIS — E78.2 MIXED HYPERLIPIDEMIA: ICD-10-CM

## 2023-09-27 PROCEDURE — 3078F DIAST BP <80 MM HG: CPT | Performed by: INTERNAL MEDICINE

## 2023-09-27 PROCEDURE — 3074F SYST BP LT 130 MM HG: CPT | Performed by: INTERNAL MEDICINE

## 2023-09-27 PROCEDURE — 99213 OFFICE O/P EST LOW 20 MIN: CPT | Performed by: INTERNAL MEDICINE

## 2023-09-27 RX ORDER — ROSUVASTATIN CALCIUM 40 MG/1
40 TABLET, FILM COATED ORAL EVERY EVENING
Qty: 90 TABLET | Refills: 1 | Status: SHIPPED | OUTPATIENT
Start: 2023-09-27

## 2023-09-27 RX ORDER — LEVONORGESTREL AND ETHINYL ESTRADIOL 6-5-10
1 KIT ORAL EVERY MORNING
COMMUNITY
Start: 2023-08-10

## 2023-10-09 LAB — MAMMOGRAPHY, EXTERNAL: NORMAL

## 2024-03-25 DIAGNOSIS — E78.2 MIXED HYPERLIPIDEMIA: ICD-10-CM

## 2024-03-26 LAB
ALBUMIN SERPL-MCNC: 4.1 G/DL (ref 3.5–5.2)
ALP BLD-CCNC: 75 U/L (ref 35–104)
ALT SERPL-CCNC: 16 U/L (ref 0–32)
ANION GAP SERPL CALCULATED.3IONS-SCNC: 19 MMOL/L (ref 7–16)
AST SERPL-CCNC: 22 U/L (ref 0–31)
BILIRUB SERPL-MCNC: 0.3 MG/DL (ref 0–1.2)
BUN BLDV-MCNC: 9 MG/DL (ref 6–20)
CALCIUM SERPL-MCNC: 9 MG/DL (ref 8.6–10.2)
CHLORIDE BLD-SCNC: 105 MMOL/L (ref 98–107)
CHOLESTEROL: 194 MG/DL
CO2: 21 MMOL/L (ref 22–29)
CREAT SERPL-MCNC: 1.1 MG/DL (ref 0.5–1)
GFR SERPL CREATININE-BSD FRML MDRD: 61 ML/MIN/1.73M2
GLUCOSE BLD-MCNC: 73 MG/DL (ref 74–99)
HDLC SERPL-MCNC: 41 MG/DL
LDL CHOLESTEROL: 126 MG/DL
POTASSIUM SERPL-SCNC: 4.4 MMOL/L (ref 3.5–5)
SODIUM BLD-SCNC: 145 MMOL/L (ref 132–146)
TOTAL PROTEIN: 7.3 G/DL (ref 6.4–8.3)
TRIGL SERPL-MCNC: 136 MG/DL
VLDLC SERPL CALC-MCNC: 27 MG/DL

## 2024-04-08 ENCOUNTER — OFFICE VISIT (OUTPATIENT)
Dept: PRIMARY CARE CLINIC | Age: 49
End: 2024-04-08
Payer: COMMERCIAL

## 2024-04-08 VITALS
OXYGEN SATURATION: 99 % | HEART RATE: 86 BPM | BODY MASS INDEX: 21.62 KG/M2 | HEIGHT: 66 IN | SYSTOLIC BLOOD PRESSURE: 130 MMHG | WEIGHT: 134.5 LBS | DIASTOLIC BLOOD PRESSURE: 76 MMHG | TEMPERATURE: 97.5 F

## 2024-04-08 DIAGNOSIS — E78.2 MIXED HYPERLIPIDEMIA: Primary | ICD-10-CM

## 2024-04-08 DIAGNOSIS — E55.9 VITAMIN D DEFICIENCY: ICD-10-CM

## 2024-04-08 PROCEDURE — 3075F SYST BP GE 130 - 139MM HG: CPT | Performed by: INTERNAL MEDICINE

## 2024-04-08 PROCEDURE — 3078F DIAST BP <80 MM HG: CPT | Performed by: INTERNAL MEDICINE

## 2024-04-08 PROCEDURE — 99213 OFFICE O/P EST LOW 20 MIN: CPT | Performed by: INTERNAL MEDICINE

## 2024-04-08 RX ORDER — ROSUVASTATIN CALCIUM 40 MG/1
40 TABLET, FILM COATED ORAL EVERY EVENING
Qty: 90 TABLET | Refills: 1 | Status: SHIPPED | OUTPATIENT
Start: 2024-04-08

## 2024-04-08 NOTE — PROGRESS NOTES
TMs clear, nose-clear, throat - no erythema  Neck:  Supple, no goiter, no carotid bruits, no LAD  Lungs:  CTA   Heart:  RRR, no murmurs, gallops or rubs  Abdomen:  Soft/nt/nd, + bowel sounds  Lymph node examination: unremarkable  Neurological exam : unremarkable  Extremities:  No clubbing, cyanosis or edema  Skin: unremarkable    Cholesterol   Date Value Ref Range Status   03/25/2024 194 <200 mg/dL Final     Triglycerides   Date Value Ref Range Status   03/25/2024 136 <150 mg/dL Final     HDL   Date Value Ref Range Status   03/25/2024 41 >40 mg/dL Final     LDL Cholesterol   Date Value Ref Range Status   03/25/2024 126 (H) <100 mg/dL Final     LDL Calculated   Date Value Ref Range Status   03/14/2023 140 (H) 0 - 99 mg/dL Final     VLDL   Date Value Ref Range Status   03/25/2024 27 mg/dL Final     Comment:     No normal range established.     Sodium   Date Value Ref Range Status   03/25/2024 145 132 - 146 mmol/L Final     Potassium   Date Value Ref Range Status   03/25/2024 4.4 3.5 - 5.0 mmol/L Final     Chloride   Date Value Ref Range Status   03/25/2024 105 98 - 107 mmol/L Final     CO2   Date Value Ref Range Status   03/25/2024 21 (L) 22 - 29 mmol/L Final     BUN   Date Value Ref Range Status   03/25/2024 9 6 - 20 mg/dL Final     Creatinine   Date Value Ref Range Status   03/25/2024 1.1 (H) 0.50 - 1.00 mg/dL Final     Glucose   Date Value Ref Range Status   03/25/2024 73 (L) 74 - 99 mg/dL Final     Calcium   Date Value Ref Range Status   03/25/2024 9.0 8.6 - 10.2 mg/dL Final     Total Protein   Date Value Ref Range Status   03/25/2024 7.3 6.4 - 8.3 g/dL Final     Albumin   Date Value Ref Range Status   03/25/2024 4.1 3.5 - 5.2 g/dL Final     Total Bilirubin   Date Value Ref Range Status   03/25/2024 0.3 0.0 - 1.2 mg/dL Final     Alkaline Phosphatase   Date Value Ref Range Status   03/25/2024 75 35 - 104 U/L Final     AST   Date Value Ref Range Status   03/25/2024 22 0 - 31 U/L Final     ALT   Date Value Ref Range

## 2024-09-10 LAB — MAMMOGRAPHY, EXTERNAL: NORMAL

## 2024-10-07 ENCOUNTER — HOSPITAL ENCOUNTER (OUTPATIENT)
Age: 49
Discharge: HOME | End: 2024-10-07
Payer: COMMERCIAL

## 2024-10-07 DIAGNOSIS — E55.9 VITAMIN D DEFICIENCY: ICD-10-CM

## 2024-10-07 DIAGNOSIS — E78.2 MIXED HYPERLIPIDEMIA: ICD-10-CM

## 2024-10-07 LAB
ALBUMIN SERPL-MCNC: 4.1 G/DL (ref 3.5–5.2)
ALP SERPL-CCNC: 71 U/L (ref 35–104)
ALT SERPL-CCNC: 12 U/L (ref 0–32)
ANION GAP SERPL CALCULATED.3IONS-SCNC: 10 MMOL/L (ref 7–16)
AST SERPL-CCNC: 21 U/L (ref 0–31)
BILIRUB SERPL-MCNC: 0.6 MG/DL (ref 0–1.2)
BUN SERPL-MCNC: 11 MG/DL (ref 6–20)
CALCIUM SERPL-MCNC: 9.3 MG/DL (ref 8.6–10.2)
CHLORIDE SERPL-SCNC: 105 MMOL/L (ref 98–107)
CHOLEST SERPL-MCNC: 226 MG/DL
CO2 SERPL-SCNC: 25 MMOL/L (ref 22–29)
CREAT SERPL-MCNC: 0.9 MG/DL (ref 0.5–1)
GFR, ESTIMATED: 79 ML/MIN/1.73M2
GLUCOSE SERPL-MCNC: 74 MG/DL (ref 74–99)
HDLC SERPL-MCNC: 55 MG/DL
LDLC SERPL CALC-MCNC: 148 MG/DL
POTASSIUM SERPL-SCNC: 4.4 MMOL/L (ref 3.5–5)
PROT SERPL-MCNC: 7.4 G/DL (ref 6.4–8.3)
SODIUM SERPL-SCNC: 140 MMOL/L (ref 132–146)
TRIGL SERPL-MCNC: 114 MG/DL
VLDLC SERPL CALC-MCNC: 23 MG/DL

## 2024-10-07 PROCEDURE — 82306 VITAMIN D 25 HYDROXY: CPT

## 2024-10-07 PROCEDURE — 80061 LIPID PANEL: CPT

## 2024-10-07 PROCEDURE — 80053 COMPREHEN METABOLIC PANEL: CPT

## 2024-10-07 PROCEDURE — 36415 COLL VENOUS BLD VENIPUNCTURE: CPT

## 2024-10-08 ENCOUNTER — OFFICE VISIT (OUTPATIENT)
Dept: PRIMARY CARE CLINIC | Age: 49
End: 2024-10-08

## 2024-10-08 VITALS
OXYGEN SATURATION: 97 % | DIASTOLIC BLOOD PRESSURE: 76 MMHG | HEART RATE: 88 BPM | SYSTOLIC BLOOD PRESSURE: 118 MMHG | BODY MASS INDEX: 21.1 KG/M2 | WEIGHT: 131.3 LBS | TEMPERATURE: 97.3 F | HEIGHT: 66 IN

## 2024-10-08 DIAGNOSIS — E78.2 MIXED HYPERLIPIDEMIA: Primary | ICD-10-CM

## 2024-10-08 DIAGNOSIS — Z23 FLU VACCINE NEED: ICD-10-CM

## 2024-10-08 LAB — 25(OH)D3 SERPL-MCNC: 42.6 NG/ML (ref 30–100)

## 2024-10-08 RX ORDER — ROSUVASTATIN CALCIUM 40 MG/1
40 TABLET, FILM COATED ORAL EVERY EVENING
Qty: 90 TABLET | Refills: 1 | Status: SHIPPED | OUTPATIENT
Start: 2024-10-08

## 2024-10-08 SDOH — ECONOMIC STABILITY: FOOD INSECURITY: WITHIN THE PAST 12 MONTHS, THE FOOD YOU BOUGHT JUST DIDN'T LAST AND YOU DIDN'T HAVE MONEY TO GET MORE.: NEVER TRUE

## 2024-10-08 SDOH — ECONOMIC STABILITY: INCOME INSECURITY: HOW HARD IS IT FOR YOU TO PAY FOR THE VERY BASICS LIKE FOOD, HOUSING, MEDICAL CARE, AND HEATING?: NOT HARD AT ALL

## 2024-10-08 SDOH — ECONOMIC STABILITY: FOOD INSECURITY: WITHIN THE PAST 12 MONTHS, YOU WORRIED THAT YOUR FOOD WOULD RUN OUT BEFORE YOU GOT MONEY TO BUY MORE.: NEVER TRUE

## 2024-10-08 NOTE — PROGRESS NOTES
equation.  Careful clinical correlation is recommended, particularly when comparing to results   calculated using previous equations.  The CKD-EPI equation is less accurate in patients with extremes of muscle mass, extra-renal   metabolism of creatine, excessive creatine ingestion, or following therapy that affects   renal tubular secretion.       GFR    Date Value Ref Range Status   07/19/2022 >60  Final        No results found.     Date of last Colonoscopy: 8/4/2022   No cologuard on file  No FIT/FOBT on file   No flexible sigmoidoscopy on file   Date of last Mammogram: 9/10/2024  No cervical cancer screening on file      Assessment/Plan:      Outpatient Encounter Medications as of 10/8/2024   Medication Sig Dispense Refill    CRESTOR 40 MG tablet Take 1 tablet by mouth every evening 90 tablet 1    LEVONEST 50-30/75-40/ 125-30 MCG TABS Take 1 tablet by mouth every morning      [DISCONTINUED] CRESTOR 40 MG tablet Take 1 tablet by mouth every evening 90 tablet 1     No facility-administered encounter medications on file as of 10/8/2024.        Ivy was seen today for follow-up.    Diagnoses and all orders for this visit:    Mixed hyperlipidemia  -     CRESTOR 40 MG tablet; Take 1 tablet by mouth every evening  -     Lipid Panel; Future  -     Hepatic Function Panel; Future    Flu vaccine need  -     Influenza, FLUCELVAX Trivalent, (age 6 mo+) IM, Preservative Free, 0.5mL         There are no Patient Instructions on file for this visit.           Ayala Dominguez MD   10/8/24

## 2025-04-03 ENCOUNTER — HOSPITAL ENCOUNTER (OUTPATIENT)
Age: 50
Discharge: HOME | End: 2025-04-03
Payer: COMMERCIAL

## 2025-04-03 DIAGNOSIS — E78.2 MIXED HYPERLIPIDEMIA: ICD-10-CM

## 2025-04-03 LAB
ALBUMIN SERPL-MCNC: 3.8 G/DL (ref 3.5–5.2)
ALP SERPL-CCNC: 59 U/L (ref 35–104)
ALT SERPL-CCNC: 12 U/L (ref 0–32)
AST SERPL-CCNC: 19 U/L (ref 0–31)
BILIRUB DIRECT SERPL-MCNC: <0.2 MG/DL (ref 0–0.3)
BILIRUB INDIRECT SERPL-MCNC: NORMAL MG/DL (ref 0–1)
BILIRUB SERPL-MCNC: 0.4 MG/DL (ref 0–1.2)
CHOLEST SERPL-MCNC: 213 MG/DL
HDLC SERPL-MCNC: 58 MG/DL
LDLC SERPL CALC-MCNC: 137 MG/DL
PROT SERPL-MCNC: 6.6 G/DL (ref 6.4–8.3)
TRIGL SERPL-MCNC: 92 MG/DL
VLDLC SERPL CALC-MCNC: 18 MG/DL

## 2025-04-03 PROCEDURE — 80076 HEPATIC FUNCTION PANEL: CPT

## 2025-04-03 PROCEDURE — 36415 COLL VENOUS BLD VENIPUNCTURE: CPT

## 2025-04-03 PROCEDURE — 80061 LIPID PANEL: CPT

## 2025-04-08 ENCOUNTER — OFFICE VISIT (OUTPATIENT)
Dept: PRIMARY CARE CLINIC | Age: 50
End: 2025-04-08
Payer: COMMERCIAL

## 2025-04-08 VITALS
DIASTOLIC BLOOD PRESSURE: 78 MMHG | HEIGHT: 66 IN | BODY MASS INDEX: 22.1 KG/M2 | HEART RATE: 75 BPM | TEMPERATURE: 98.2 F | SYSTOLIC BLOOD PRESSURE: 138 MMHG | WEIGHT: 137.5 LBS | OXYGEN SATURATION: 98 %

## 2025-04-08 DIAGNOSIS — Z80.3 FAMILY HISTORY OF BREAST CANCER: ICD-10-CM

## 2025-04-08 DIAGNOSIS — E78.2 MIXED HYPERLIPIDEMIA: Primary | ICD-10-CM

## 2025-04-08 DIAGNOSIS — E78.2 MIXED HYPERLIPIDEMIA: ICD-10-CM

## 2025-04-08 PROCEDURE — 3078F DIAST BP <80 MM HG: CPT | Performed by: INTERNAL MEDICINE

## 2025-04-08 PROCEDURE — 3075F SYST BP GE 130 - 139MM HG: CPT | Performed by: INTERNAL MEDICINE

## 2025-04-08 PROCEDURE — 99213 OFFICE O/P EST LOW 20 MIN: CPT | Performed by: INTERNAL MEDICINE

## 2025-04-08 RX ORDER — ROSUVASTATIN CALCIUM 40 MG/1
40 TABLET, FILM COATED ORAL EVERY EVENING
Qty: 90 TABLET | Refills: 1 | Status: SHIPPED | OUTPATIENT
Start: 2025-04-08

## 2025-04-08 RX ORDER — ROSUVASTATIN CALCIUM 40 MG/1
40 TABLET, COATED ORAL EVERY EVENING
Qty: 90 TABLET | Refills: 1 | OUTPATIENT
Start: 2025-04-08

## 2025-04-08 NOTE — PROGRESS NOTES
extra-renal   metabolism of creatine, excessive creatine ingestion, or following therapy that affects   renal tubular secretion.       GFR    Date Value Ref Range Status   07/19/2022 >60  Final        No results found.     Date of last Colonoscopy: 8/4/2022   No cologuard on file  No FIT/FOBT on file   No flexible sigmoidoscopy on file   Date of last Mammogram: 9/10/2024  No cervical cancer screening on file      Assessment/Plan:      Outpatient Encounter Medications as of 4/8/2025   Medication Sig Dispense Refill    CRESTOR 40 MG tablet Take 1 tablet by mouth every evening 90 tablet 1    LEVONEST 50-30/75-40/ 125-30 MCG TABS Take 1 tablet by mouth every morning      [DISCONTINUED] CRESTOR 40 MG tablet Take 1 tablet by mouth every evening 90 tablet 1     No facility-administered encounter medications on file as of 4/8/2025.        Ivy was seen today for 6 month follow-up.    Diagnoses and all orders for this visit:    Mixed hyperlipidemia  -     CRESTOR 40 MG tablet; Take 1 tablet by mouth every evening  -     Lipid Panel; Future  -     Comprehensive Metabolic Panel; Future    Family history of breast cancer         There are no Patient Instructions on file for this visit.           Ayala Dominguez MD   4/8/25